# Patient Record
Sex: MALE | Race: WHITE | NOT HISPANIC OR LATINO | Employment: UNEMPLOYED | URBAN - METROPOLITAN AREA
[De-identification: names, ages, dates, MRNs, and addresses within clinical notes are randomized per-mention and may not be internally consistent; named-entity substitution may affect disease eponyms.]

---

## 2021-12-16 ENCOUNTER — HOSPITAL ENCOUNTER (INPATIENT)
Facility: OTHER | Age: 56
LOS: 3 days | Discharge: HOME | DRG: 287 | End: 2021-12-19
Attending: HOSPITALIST | Admitting: INTERNAL MEDICINE
Payer: MEDICAID

## 2021-12-16 DIAGNOSIS — I27.20 PULMONARY HYPERTENSION, UNSPECIFIED: Primary | ICD-10-CM

## 2021-12-16 DIAGNOSIS — I21.4 NSTEMI (NON-ST ELEVATED MYOCARDIAL INFARCTION): ICD-10-CM

## 2021-12-16 DIAGNOSIS — I50.812 CHRONIC RIGHT-SIDED CONGESTIVE HEART FAILURE: ICD-10-CM

## 2021-12-16 DIAGNOSIS — R07.9 CHEST PAIN: ICD-10-CM

## 2021-12-16 DIAGNOSIS — I5A MYOCARDIAL INJURY: ICD-10-CM

## 2021-12-16 PROBLEM — I10 PRIMARY HYPERTENSION: Status: ACTIVE | Noted: 2021-12-16

## 2021-12-16 LAB — TROPONIN I SERPL DL<=0.01 NG/ML-MCNC: 0.02 NG/ML (ref 0–0.03)

## 2021-12-16 PROCEDURE — 99900035 HC TECH TIME PER 15 MIN (STAT)

## 2021-12-16 PROCEDURE — 93005 ELECTROCARDIOGRAM TRACING: CPT

## 2021-12-16 PROCEDURE — 84484 ASSAY OF TROPONIN QUANT: CPT | Performed by: NURSE PRACTITIONER

## 2021-12-16 PROCEDURE — 99223 1ST HOSP IP/OBS HIGH 75: CPT | Mod: ,,, | Performed by: NURSE PRACTITIONER

## 2021-12-16 PROCEDURE — 21400001 HC TELEMETRY ROOM

## 2021-12-16 PROCEDURE — 94761 N-INVAS EAR/PLS OXIMETRY MLT: CPT

## 2021-12-16 PROCEDURE — 99222 1ST HOSP IP/OBS MODERATE 55: CPT | Mod: ,,, | Performed by: INTERNAL MEDICINE

## 2021-12-16 PROCEDURE — 99223 PR INITIAL HOSPITAL CARE,LEVL III: ICD-10-PCS | Mod: ,,, | Performed by: NURSE PRACTITIONER

## 2021-12-16 PROCEDURE — 25000003 PHARM REV CODE 250: Performed by: INTERNAL MEDICINE

## 2021-12-16 PROCEDURE — 25000003 PHARM REV CODE 250: Performed by: NURSE PRACTITIONER

## 2021-12-16 PROCEDURE — 99222 PR INITIAL HOSPITAL CARE,LEVL II: ICD-10-PCS | Mod: ,,, | Performed by: INTERNAL MEDICINE

## 2021-12-16 PROCEDURE — 27000221 HC OXYGEN, UP TO 24 HOURS

## 2021-12-16 PROCEDURE — 36415 COLL VENOUS BLD VENIPUNCTURE: CPT | Performed by: NURSE PRACTITIONER

## 2021-12-16 RX ORDER — LISINOPRIL AND HYDROCHLOROTHIAZIDE 20; 25 MG/1; MG/1
1 TABLET ORAL DAILY
Status: DISCONTINUED | OUTPATIENT
Start: 2021-12-17 | End: 2021-12-16

## 2021-12-16 RX ORDER — ENOXAPARIN SODIUM 100 MG/ML
40 INJECTION SUBCUTANEOUS EVERY 24 HOURS
Status: DISCONTINUED | OUTPATIENT
Start: 2021-12-16 | End: 2021-12-16

## 2021-12-16 RX ORDER — ALLOPURINOL 300 MG/1
300 TABLET ORAL DAILY
Status: DISCONTINUED | OUTPATIENT
Start: 2021-12-17 | End: 2021-12-19 | Stop reason: HOSPADM

## 2021-12-16 RX ORDER — SPIRONOLACTONE 25 MG/1
25 TABLET ORAL DAILY
Status: DISCONTINUED | OUTPATIENT
Start: 2021-12-17 | End: 2021-12-17

## 2021-12-16 RX ORDER — CARVEDILOL 12.5 MG/1
25 TABLET ORAL 2 TIMES DAILY
Status: DISCONTINUED | OUTPATIENT
Start: 2021-12-16 | End: 2021-12-17

## 2021-12-16 RX ORDER — HYDROCHLOROTHIAZIDE 25 MG/1
25 TABLET ORAL DAILY
Status: DISCONTINUED | OUTPATIENT
Start: 2021-12-17 | End: 2021-12-16

## 2021-12-16 RX ORDER — ACETAMINOPHEN 325 MG/1
650 TABLET ORAL EVERY 4 HOURS PRN
Status: DISCONTINUED | OUTPATIENT
Start: 2021-12-16 | End: 2021-12-19 | Stop reason: HOSPADM

## 2021-12-16 RX ORDER — VALSARTAN 80 MG/1
320 TABLET ORAL DAILY
Status: DISCONTINUED | OUTPATIENT
Start: 2021-12-17 | End: 2021-12-17

## 2021-12-16 RX ORDER — AMLODIPINE BESYLATE 2.5 MG/1
2.5 TABLET ORAL DAILY
Status: DISCONTINUED | OUTPATIENT
Start: 2021-12-17 | End: 2021-12-17

## 2021-12-16 RX ORDER — NITROGLYCERIN 80 MG/1
1 PATCH TRANSDERMAL DAILY
Status: DISCONTINUED | OUTPATIENT
Start: 2021-12-16 | End: 2021-12-17

## 2021-12-16 RX ORDER — ONDANSETRON 8 MG/1
8 TABLET, ORALLY DISINTEGRATING ORAL EVERY 8 HOURS PRN
Status: DISCONTINUED | OUTPATIENT
Start: 2021-12-16 | End: 2021-12-19 | Stop reason: HOSPADM

## 2021-12-16 RX ORDER — SODIUM CHLORIDE 9 MG/ML
INJECTION, SOLUTION INTRAVENOUS CONTINUOUS
Status: CANCELLED | OUTPATIENT
Start: 2021-12-17

## 2021-12-16 RX ORDER — FUROSEMIDE 20 MG/1
40 TABLET ORAL DAILY
Status: DISCONTINUED | OUTPATIENT
Start: 2021-12-17 | End: 2021-12-17

## 2021-12-16 RX ORDER — LISINOPRIL 20 MG/1
20 TABLET ORAL DAILY
Status: DISCONTINUED | OUTPATIENT
Start: 2021-12-17 | End: 2021-12-16

## 2021-12-16 RX ORDER — NITROGLYCERIN 0.4 MG/1
0.4 TABLET SUBLINGUAL EVERY 5 MIN PRN
Status: DISCONTINUED | OUTPATIENT
Start: 2021-12-16 | End: 2021-12-18

## 2021-12-16 RX ORDER — DIPHENHYDRAMINE HCL 25 MG
25 CAPSULE ORAL
Status: CANCELLED | OUTPATIENT
Start: 2021-12-17

## 2021-12-16 RX ORDER — SODIUM CHLORIDE 0.9 % (FLUSH) 0.9 %
10 SYRINGE (ML) INJECTION EVERY 8 HOURS PRN
Status: DISCONTINUED | OUTPATIENT
Start: 2021-12-16 | End: 2021-12-19 | Stop reason: HOSPADM

## 2021-12-16 RX ORDER — ASPIRIN 81 MG/1
81 TABLET ORAL DAILY
Status: DISCONTINUED | OUTPATIENT
Start: 2021-12-17 | End: 2021-12-17

## 2021-12-16 RX ADMIN — CARVEDILOL 25 MG: 12.5 TABLET, FILM COATED ORAL at 08:12

## 2021-12-16 RX ADMIN — NITROGLYCERIN 1 PATCH: 0.4 PATCH TRANSDERMAL at 08:12

## 2021-12-16 NOTE — PROVIDER TRANSFER
Outside Transfer Acceptance Note / [facility] Referral [facility]    Referring facility: [facility] [place]  Referring provider: LEFORT, GUY J.  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  Patient had a milder episode earlier this week. Labs WBC 5.8, Hb 16.7, Plts 235, Na 142, K 4.3, CO2 28, Cr 1.5, BNP 23, troponin < 0.05, repeat troponin pending.  COVID neg.  CXR pos for mild PVC.  EKG pos for ST depression in V3-V4 which was new when compared to prior.  Patient given Lasix, sublingual NG and aspirin.  CP has resolved.  Referring physician spoke to [facility] (Dr Anjelica Botello) who requested transfer for admission by  with no additional recommendations.  Transfer diagnosis NSTEMI      Review of patient's allergies indicates:   Allergen Reactions    Penicillins Other (See Comments)     Reaction unknown- childhood allergy      NPO: No      Anticoagulation:   Anticoagulants     None           Instructions      [facility]  Admit to Hospital Medicine  Upon patient arrival to floor, please contact Hospital Medicine on call.

## 2021-12-16 NOTE — Clinical Note
A percutaneous stick to the right radial artery was performed. Ultrasound guidance was used to obtain access.

## 2021-12-16 NOTE — ASSESSMENT & PLAN NOTE
Patient reports chest pain, troponin <.005, ST depression in V3,V4    Consult Cardiology  Cardiac monitoring  Trend Troponin  EKG

## 2021-12-16 NOTE — Clinical Note
The radial band was applied to the right radial artery. 9 cc's of air were inserted into the closure device.

## 2021-12-16 NOTE — Clinical Note
The catheter was repositioned into the ostium   left main. An angiography was performed of the left coronary arteries. Multiple views were taken. The angiography was performed via hand injection with .

## 2021-12-16 NOTE — HPI
The patient is a 56-year-old male with a past medical history of amphetamine use (in recovery) CHF and PE presented to [facility] today with CP starting 90 minutes earlier with onset during light activity and a/w SOB nausea and diaphoresis.  Patient had a milder episode earlier this week. Labs WBC 5.8, Hb 16.7, Plts 235, Na 142, K 4.3, CO2 28, Cr 1.5, BNP 23, troponin < 0.05, repeat troponin pending.  COVID neg.  CXR pos for mild PVC.  EKG pos for ST depression in V3-V4 which was new when compared to prior.  Patient given Lasix, sublingual NG and aspirin.  CP has resolved.  The patient was transferred to [facility] for Cardiology consult.

## 2021-12-16 NOTE — Clinical Note
65 ml of contrast were injected throughout the case. 35 mL of contrast was the total wasted during the case. 100 mL was the total amount used during the case.

## 2021-12-16 NOTE — Clinical Note
The site was marked. The groin was prepped. The site was prepped with ChloraPrep. The site was clipped. The patient was draped. The patient was positioned supine.

## 2021-12-16 NOTE — Clinical Note
The catheter was repositioned into the ostium   right coronary artery. An angiography was performed of the right coronary arteries. Multiple views were taken. The angiography was performed via hand injection with .

## 2021-12-17 PROBLEM — R07.9 CHEST PAIN: Status: ACTIVE | Noted: 2021-12-16

## 2021-12-17 PROBLEM — I27.82 CHRONIC PULMONARY EMBOLISM: Status: ACTIVE | Noted: 2021-12-17

## 2021-12-17 PROBLEM — I50.9 CHF (CONGESTIVE HEART FAILURE): Status: ACTIVE | Noted: 2021-12-17

## 2021-12-17 LAB
ALBUMIN SERPL BCP-MCNC: 3.5 G/DL (ref 3.5–5.2)
ALP SERPL-CCNC: 69 U/L (ref 55–135)
ALT SERPL W/O P-5'-P-CCNC: 151 U/L (ref 10–44)
AMPHET+METHAMPHET UR QL: NEGATIVE
ANION GAP SERPL CALC-SCNC: 14 MMOL/L (ref 8–16)
APTT BLDCRRT: 28.4 SEC (ref 21–32)
ASCENDING AORTA: 2.97 CM
AST SERPL-CCNC: 101 U/L (ref 10–40)
AV INDEX (PROSTH): 0.76
AV MEAN GRADIENT: 3 MMHG
AV PEAK GRADIENT: 6 MMHG
AV VALVE AREA: 2.19 CM2
AV VELOCITY RATIO: 0.7
BARBITURATES UR QL SCN>200 NG/ML: NEGATIVE
BASOPHILS # BLD AUTO: 0.05 K/UL (ref 0–0.2)
BASOPHILS # BLD AUTO: 0.06 K/UL (ref 0–0.2)
BASOPHILS NFR BLD: 0.8 % (ref 0–1.9)
BASOPHILS NFR BLD: 0.8 % (ref 0–1.9)
BENZODIAZ UR QL SCN>200 NG/ML: NEGATIVE
BILIRUB SERPL-MCNC: 1 MG/DL (ref 0.1–1)
BNP SERPL-MCNC: 13 PG/ML (ref 0–99)
BSA FOR ECHO PROCEDURE: 1.94 M2
BUN SERPL-MCNC: 25 MG/DL (ref 6–20)
BZE UR QL SCN: NEGATIVE
CALCIUM SERPL-MCNC: 9.3 MG/DL (ref 8.7–10.5)
CANNABINOIDS UR QL SCN: NEGATIVE
CHLORIDE SERPL-SCNC: 102 MMOL/L (ref 95–110)
CHOLEST SERPL-MCNC: 197 MG/DL (ref 120–199)
CHOLEST/HDLC SERPL: 6.6 {RATIO} (ref 2–5)
CO2 SERPL-SCNC: 23 MMOL/L (ref 23–29)
CREAT SERPL-MCNC: 1.2 MG/DL (ref 0.5–1.4)
CREAT UR-MCNC: 160.1 MG/DL (ref 23–375)
CV ECHO LV RWT: 0.37 CM
D DIMER PPP IA.FEU-MCNC: <0.19 MG/L FEU
DIFFERENTIAL METHOD: NORMAL
DIFFERENTIAL METHOD: NORMAL
DOP CALC AO PEAK VEL: 1.22 M/S
DOP CALC AO VTI: 25.93 CM
DOP CALC LVOT AREA: 2.9 CM2
DOP CALC LVOT DIAMETER: 1.92 CM
DOP CALC LVOT PEAK VEL: 0.85 M/S
DOP CALC LVOT STROKE VOLUME: 56.75 CM3
DOP CALCLVOT PEAK VEL VTI: 19.61 CM
ECHO LV POSTERIOR WALL: 0.76 CM (ref 0.6–1.1)
EJECTION FRACTION: 60 %
EOSINOPHIL # BLD AUTO: 0.2 K/UL (ref 0–0.5)
EOSINOPHIL # BLD AUTO: 0.2 K/UL (ref 0–0.5)
EOSINOPHIL NFR BLD: 2 % (ref 0–8)
EOSINOPHIL NFR BLD: 2.8 % (ref 0–8)
ERYTHROCYTE [DISTWIDTH] IN BLOOD BY AUTOMATED COUNT: 13.4 % (ref 11.5–14.5)
ERYTHROCYTE [DISTWIDTH] IN BLOOD BY AUTOMATED COUNT: 13.5 % (ref 11.5–14.5)
EST. GFR  (AFRICAN AMERICAN): >60 ML/MIN/1.73 M^2
EST. GFR  (NON AFRICAN AMERICAN): >60 ML/MIN/1.73 M^2
ETHANOL UR-MCNC: <10 MG/DL
FRACTIONAL SHORTENING: 32 % (ref 28–44)
GLUCOSE SERPL-MCNC: 113 MG/DL (ref 70–110)
HCT VFR BLD AUTO: 48.7 % (ref 40–54)
HCT VFR BLD AUTO: 52.7 % (ref 40–54)
HDLC SERPL-MCNC: 30 MG/DL (ref 40–75)
HDLC SERPL: 15.2 % (ref 20–50)
HGB BLD-MCNC: 16 G/DL (ref 14–18)
HGB BLD-MCNC: 17.5 G/DL (ref 14–18)
IMM GRANULOCYTES # BLD AUTO: 0.03 K/UL (ref 0–0.04)
IMM GRANULOCYTES # BLD AUTO: 0.03 K/UL (ref 0–0.04)
IMM GRANULOCYTES NFR BLD AUTO: 0.4 % (ref 0–0.5)
IMM GRANULOCYTES NFR BLD AUTO: 0.5 % (ref 0–0.5)
INR PPP: 1 (ref 0.8–1.2)
INTERVENTRICULAR SEPTUM: 0.84 CM (ref 0.6–1.1)
LA MAJOR: 5.68 CM
LA MINOR: 5.33 CM
LA WIDTH: 4.28 CM
LDLC SERPL CALC-MCNC: 116.8 MG/DL (ref 63–159)
LEFT ATRIUM SIZE: 4.1 CM
LEFT ATRIUM VOLUME INDEX MOD: 34.6 ML/M2
LEFT ATRIUM VOLUME INDEX: 42.9 ML/M2
LEFT ATRIUM VOLUME MOD: 66 CM3
LEFT ATRIUM VOLUME: 82.03 CM3
LEFT INTERNAL DIMENSION IN SYSTOLE: 2.79 CM (ref 2.1–4)
LEFT VENTRICLE DIASTOLIC VOLUME INDEX: 38.58 ML/M2
LEFT VENTRICLE DIASTOLIC VOLUME: 73.68 ML
LEFT VENTRICLE MASS INDEX: 51 G/M2
LEFT VENTRICLE SYSTOLIC VOLUME INDEX: 15.3 ML/M2
LEFT VENTRICLE SYSTOLIC VOLUME: 29.28 ML
LEFT VENTRICULAR INTERNAL DIMENSION IN DIASTOLE: 4.09 CM (ref 3.5–6)
LEFT VENTRICULAR MASS: 96.95 G
LYMPHOCYTES # BLD AUTO: 2.1 K/UL (ref 1–4.8)
LYMPHOCYTES # BLD AUTO: 2.6 K/UL (ref 1–4.8)
LYMPHOCYTES NFR BLD: 32.8 % (ref 18–48)
LYMPHOCYTES NFR BLD: 35.3 % (ref 18–48)
MAGNESIUM SERPL-MCNC: 1.9 MG/DL (ref 1.6–2.6)
MCH RBC QN AUTO: 30.4 PG (ref 27–31)
MCH RBC QN AUTO: 30.8 PG (ref 27–31)
MCHC RBC AUTO-ENTMCNC: 32.9 G/DL (ref 32–36)
MCHC RBC AUTO-ENTMCNC: 33.2 G/DL (ref 32–36)
MCV RBC AUTO: 93 FL (ref 82–98)
MCV RBC AUTO: 93 FL (ref 82–98)
METHADONE UR QL SCN>300 NG/ML: NEGATIVE
MONOCYTES # BLD AUTO: 0.6 K/UL (ref 0.3–1)
MONOCYTES # BLD AUTO: 0.6 K/UL (ref 0.3–1)
MONOCYTES NFR BLD: 8.2 % (ref 4–15)
MONOCYTES NFR BLD: 9.9 % (ref 4–15)
NEUTROPHILS # BLD AUTO: 3.4 K/UL (ref 1.8–7.7)
NEUTROPHILS # BLD AUTO: 4 K/UL (ref 1.8–7.7)
NEUTROPHILS NFR BLD: 53.2 % (ref 38–73)
NEUTROPHILS NFR BLD: 53.3 % (ref 38–73)
NONHDLC SERPL-MCNC: 167 MG/DL
NRBC BLD-RTO: 0 /100 WBC
NRBC BLD-RTO: 0 /100 WBC
OPIATES UR QL SCN: NEGATIVE
PCP UR QL SCN>25 NG/ML: NEGATIVE
PHOSPHATE SERPL-MCNC: 3.8 MG/DL (ref 2.7–4.5)
PISA MRMAX VEL: 0.04 M/S
PISA TR MAX VEL: 3.81 M/S
PLATELET # BLD AUTO: 202 K/UL (ref 150–450)
PLATELET # BLD AUTO: 230 K/UL (ref 150–450)
PMV BLD AUTO: 9.7 FL (ref 9.2–12.9)
PMV BLD AUTO: 9.8 FL (ref 9.2–12.9)
POTASSIUM SERPL-SCNC: 4.4 MMOL/L (ref 3.5–5.1)
PROT SERPL-MCNC: 7 G/DL (ref 6–8.4)
PROTHROMBIN TIME: 10.9 SEC (ref 9–12.5)
PULM VEIN S/D RATIO: 1.68
PV PEAK D VEL: 0.28 M/S
PV PEAK S VEL: 0.47 M/S
PV PEAK VELOCITY: 0.85 CM/S
RA MAJOR: 6.28 CM
RA PRESSURE: 8 MMHG
RA WIDTH: 3.67 CM
RBC # BLD AUTO: 5.26 M/UL (ref 4.6–6.2)
RBC # BLD AUTO: 5.68 M/UL (ref 4.6–6.2)
SINUS: 3.21 CM
SODIUM SERPL-SCNC: 139 MMOL/L (ref 136–145)
STJ: 3.02 CM
TDI LATERAL: 0.07 M/S
TDI SEPTAL: 0.03 M/S
TDI: 0.05 M/S
TOXICOLOGY INFORMATION: NORMAL
TR MAX PG: 58 MMHG
TRICUSPID ANNULAR PLANE SYSTOLIC EXCURSION: 1.77 CM
TRIGL SERPL-MCNC: 251 MG/DL (ref 30–150)
TROPONIN I SERPL DL<=0.01 NG/ML-MCNC: 0.02 NG/ML (ref 0–0.03)
TV REST PULMONARY ARTERY PRESSURE: 66 MMHG
WBC # BLD AUTO: 6.44 K/UL (ref 3.9–12.7)
WBC # BLD AUTO: 7.44 K/UL (ref 3.9–12.7)

## 2021-12-17 PROCEDURE — 25000003 PHARM REV CODE 250: Performed by: NURSE PRACTITIONER

## 2021-12-17 PROCEDURE — 36415 COLL VENOUS BLD VENIPUNCTURE: CPT | Performed by: NURSE PRACTITIONER

## 2021-12-17 PROCEDURE — 63600175 PHARM REV CODE 636 W HCPCS: Performed by: INTERNAL MEDICINE

## 2021-12-17 PROCEDURE — 80307 DRUG TEST PRSMV CHEM ANLYZR: CPT | Performed by: NURSE PRACTITIONER

## 2021-12-17 PROCEDURE — 99233 PR SUBSEQUENT HOSPITAL CARE,LEVL III: ICD-10-PCS | Mod: 25,,, | Performed by: INTERNAL MEDICINE

## 2021-12-17 PROCEDURE — 93460 R&L HRT ART/VENTRICLE ANGIO: CPT | Mod: 26,,, | Performed by: INTERNAL MEDICINE

## 2021-12-17 PROCEDURE — 21400001 HC TELEMETRY ROOM

## 2021-12-17 PROCEDURE — 93005 ELECTROCARDIOGRAM TRACING: CPT

## 2021-12-17 PROCEDURE — 85379 FIBRIN DEGRADATION QUANT: CPT | Performed by: INTERNAL MEDICINE

## 2021-12-17 PROCEDURE — 99233 PR SUBSEQUENT HOSPITAL CARE,LEVL III: ICD-10-PCS | Mod: ,,, | Performed by: INTERNAL MEDICINE

## 2021-12-17 PROCEDURE — 84100 ASSAY OF PHOSPHORUS: CPT | Performed by: NURSE PRACTITIONER

## 2021-12-17 PROCEDURE — 85730 THROMBOPLASTIN TIME PARTIAL: CPT | Performed by: INTERNAL MEDICINE

## 2021-12-17 PROCEDURE — 85025 COMPLETE CBC W/AUTO DIFF WBC: CPT | Performed by: NURSE PRACTITIONER

## 2021-12-17 PROCEDURE — 85610 PROTHROMBIN TIME: CPT | Performed by: INTERNAL MEDICINE

## 2021-12-17 PROCEDURE — C1751 CATH, INF, PER/CENT/MIDLINE: HCPCS | Performed by: INTERNAL MEDICINE

## 2021-12-17 PROCEDURE — 25000003 PHARM REV CODE 250: Performed by: INTERNAL MEDICINE

## 2021-12-17 PROCEDURE — C1769 GUIDE WIRE: HCPCS | Performed by: INTERNAL MEDICINE

## 2021-12-17 PROCEDURE — C1894 INTRO/SHEATH, NON-LASER: HCPCS | Performed by: INTERNAL MEDICINE

## 2021-12-17 PROCEDURE — 83880 ASSAY OF NATRIURETIC PEPTIDE: CPT | Performed by: INTERNAL MEDICINE

## 2021-12-17 PROCEDURE — 83735 ASSAY OF MAGNESIUM: CPT | Performed by: NURSE PRACTITIONER

## 2021-12-17 PROCEDURE — 25500020 PHARM REV CODE 255: Performed by: INTERNAL MEDICINE

## 2021-12-17 PROCEDURE — 93460 PR CATH PLACE/CORON ANGIO, IMG SUPER/INTERP,R&L HRT CATH, L HRT VENTRIC: ICD-10-PCS | Mod: 26,,, | Performed by: INTERNAL MEDICINE

## 2021-12-17 PROCEDURE — 99233 SBSQ HOSP IP/OBS HIGH 50: CPT | Mod: ,,, | Performed by: INTERNAL MEDICINE

## 2021-12-17 PROCEDURE — 84484 ASSAY OF TROPONIN QUANT: CPT | Performed by: NURSE PRACTITIONER

## 2021-12-17 PROCEDURE — C1887 CATHETER, GUIDING: HCPCS | Performed by: INTERNAL MEDICINE

## 2021-12-17 PROCEDURE — 27000221 HC OXYGEN, UP TO 24 HOURS

## 2021-12-17 PROCEDURE — 36415 COLL VENOUS BLD VENIPUNCTURE: CPT | Performed by: INTERNAL MEDICINE

## 2021-12-17 PROCEDURE — 93460 R&L HRT ART/VENTRICLE ANGIO: CPT | Performed by: INTERNAL MEDICINE

## 2021-12-17 PROCEDURE — 85025 COMPLETE CBC W/AUTO DIFF WBC: CPT | Mod: 91 | Performed by: INTERNAL MEDICINE

## 2021-12-17 PROCEDURE — 94761 N-INVAS EAR/PLS OXIMETRY MLT: CPT

## 2021-12-17 PROCEDURE — 80053 COMPREHEN METABOLIC PANEL: CPT | Performed by: NURSE PRACTITIONER

## 2021-12-17 PROCEDURE — 80061 LIPID PANEL: CPT | Performed by: NURSE PRACTITIONER

## 2021-12-17 PROCEDURE — 99233 SBSQ HOSP IP/OBS HIGH 50: CPT | Mod: 25,,, | Performed by: INTERNAL MEDICINE

## 2021-12-17 RX ORDER — HEPARIN SODIUM,PORCINE/D5W 25000/250
0-40 INTRAVENOUS SOLUTION INTRAVENOUS CONTINUOUS
Status: DISCONTINUED | OUTPATIENT
Start: 2021-12-17 | End: 2021-12-19

## 2021-12-17 RX ORDER — MIDAZOLAM HYDROCHLORIDE 1 MG/ML
INJECTION INTRAMUSCULAR; INTRAVENOUS
Status: DISCONTINUED | OUTPATIENT
Start: 2021-12-17 | End: 2021-12-17 | Stop reason: HOSPADM

## 2021-12-17 RX ORDER — SODIUM CHLORIDE 9 MG/ML
INJECTION, SOLUTION INTRAVENOUS CONTINUOUS
Status: ACTIVE | OUTPATIENT
Start: 2021-12-17 | End: 2021-12-18

## 2021-12-17 RX ORDER — ATORVASTATIN CALCIUM 20 MG/1
40 TABLET, FILM COATED ORAL DAILY
Status: DISCONTINUED | OUTPATIENT
Start: 2021-12-17 | End: 2021-12-19 | Stop reason: HOSPADM

## 2021-12-17 RX ORDER — ONDANSETRON 8 MG/1
8 TABLET, ORALLY DISINTEGRATING ORAL EVERY 8 HOURS PRN
Status: DISCONTINUED | OUTPATIENT
Start: 2021-12-17 | End: 2021-12-19 | Stop reason: HOSPADM

## 2021-12-17 RX ORDER — FENTANYL CITRATE 50 UG/ML
INJECTION, SOLUTION INTRAMUSCULAR; INTRAVENOUS
Status: DISCONTINUED | OUTPATIENT
Start: 2021-12-17 | End: 2021-12-17 | Stop reason: HOSPADM

## 2021-12-17 RX ORDER — ACETAMINOPHEN 325 MG/1
650 TABLET ORAL EVERY 4 HOURS PRN
Status: DISCONTINUED | OUTPATIENT
Start: 2021-12-17 | End: 2021-12-19 | Stop reason: HOSPADM

## 2021-12-17 RX ADMIN — SPIRONOLACTONE 25 MG: 25 TABLET ORAL at 10:12

## 2021-12-17 RX ADMIN — NITROGLYCERIN 1 PATCH: 0.4 PATCH TRANSDERMAL at 10:12

## 2021-12-17 RX ADMIN — ASPIRIN 81 MG: 81 TABLET, COATED ORAL at 10:12

## 2021-12-17 RX ADMIN — ALLOPURINOL 300 MG: 300 TABLET ORAL at 10:12

## 2021-12-17 RX ADMIN — VALSARTAN 320 MG: 80 TABLET, FILM COATED ORAL at 10:12

## 2021-12-17 RX ADMIN — SODIUM CHLORIDE: 0.9 INJECTION, SOLUTION INTRAVENOUS at 09:12

## 2021-12-17 RX ADMIN — CARVEDILOL 25 MG: 12.5 TABLET, FILM COATED ORAL at 10:12

## 2021-12-17 RX ADMIN — ACETAMINOPHEN 650 MG: 325 TABLET, FILM COATED ORAL at 05:12

## 2021-12-17 RX ADMIN — HEPARIN SODIUM 18 UNITS/KG/HR: 10000 INJECTION, SOLUTION INTRAVENOUS at 10:12

## 2021-12-17 RX ADMIN — ATORVASTATIN CALCIUM 40 MG: 20 TABLET, FILM COATED ORAL at 09:12

## 2021-12-17 NOTE — PROGRESS NOTES
[facility]  Progress Note    Patient Name: Claude Griffin  MRN: [id number]  Patient Class: IP- Inpatient   Admission Date: 12/16/2021  Length of Stay: 1 days  Attending Physician: Salima Chu MD  Primary Care Provider: David Terrell MD        Subjective:     Principal Problem:Chest pain        HPI:  The patient is a 56-year-old male with a past medical history of amphetamine use (in recovery) CHF and PE presented to [facility] today with CP starting 90 minutes earlier with onset during light activity and a/w SOB nausea and diaphoresis.  Patient had a milder episode earlier this week. Labs WBC 5.8, Hb 16.7, Plts 235, Na 142, K 4.3, CO2 28, Cr 1.5, BNP 23, troponin < 0.05, repeat troponin pending.  COVID neg.  CXR pos for mild PVC.  EKG pos for ST depression in V3-V4 which was new when compared to prior.  Patient given Lasix, sublingual NG and aspirin.  CP has resolved.  The patient was transferred to [facility] for Cardiology consult.      Overview/Hospital Course:  No notes on file    Interval History: Cheat pain better, c/o sob on minimal activity.    Review of Systems   Constitutional: Negative for chills and fever.   HENT: Negative for trouble swallowing.    Respiratory: Positive for shortness of breath. Negative for cough.    Cardiovascular: Positive for chest pain. Negative for leg swelling.   Gastrointestinal: Negative for abdominal pain, blood in stool, nausea and vomiting.   Genitourinary: Negative for dysuria and hematuria.   Musculoskeletal: Negative for gait problem.   Skin: Negative for rash.   Neurological: Negative for headaches.   Psychiatric/Behavioral: Negative for confusion.     Objective:     Vital Signs (Most Recent):  Temp: 97.9 °F (36.6 °C) (12/17/21 1226)  Pulse: 62 (12/17/21 1226)  Resp: 18 (12/17/21 1226)  BP: 113/76 (12/17/21 1226)  SpO2: (!) 94 % (12/17/21 1226) Vital Signs (24h Range):  Temp:  [97.6 °F (36.4 °C)-98.8 °F (37.1  °C)] 97.9 °F (36.6 °C)  Pulse:  [56-77] 62  Resp:  [17-20] 18  SpO2:  [93 %-99 %] 94 %  BP: ()/(60-80) 113/76     Weight: 81.2 kg (179 lb)  Body mass index is 28.89 kg/m².    Intake/Output Summary (Last 24 hours) at 12/17/2021 1351  Last data filed at 12/17/2021 1100  Gross per 24 hour   Intake 770 ml   Output 1175 ml   Net -405 ml      Physical Exam  Vitals reviewed.   Constitutional:       General: He is not in acute distress.     Appearance: He is well-developed.   HENT:      Head: Normocephalic and atraumatic.   Eyes:      Extraocular Movements: Extraocular movements intact.      Pupils: Pupils are equal, round, and reactive to light.   Cardiovascular:      Rate and Rhythm: Normal rate and regular rhythm.   Pulmonary:      Effort: Pulmonary effort is normal. No respiratory distress.      Breath sounds: Normal breath sounds.   Abdominal:      General: Bowel sounds are normal. There is no distension.      Palpations: Abdomen is soft.      Tenderness: There is no abdominal tenderness.   Musculoskeletal:         General: No swelling. Normal range of motion.      Cervical back: Normal range of motion and neck supple.   Skin:     General: Skin is warm.      Findings: No rash.   Neurological:      General: No focal deficit present.      Mental Status: He is alert and oriented to person, place, and time.   Psychiatric:         Mood and Affect: Mood normal.         Behavior: Behavior normal.         Significant Labs: All pertinent labs within the past 24 hours have been reviewed.    Significant Imaging: I have reviewed all pertinent imaging results/findings within the past 24 hours.      Assessment/Plan:      * Chest pain  Patient reports chest pain, troponin <.005, ST depression in V3,V4    Consulted Cardiology, plan for [facility] 12/17  Cardiac monitoring  Trend Troponin, negative  Continue asa, coreg  Results for orders placed during the hospital encounter of 12/16/21    Echo Saline Bubble? No    Interpretation  Summary  · The left ventricle is normal in size with normal systolic function.  · The estimated ejection fraction is 60%.  · Grade II left ventricular diastolic dysfunction.  · There is abnormal septal wall motion. There is systolic flattening of the interventricular septum consistent with right ventricle pressure overload.  · Moderate left atrial enlargement.  · Severe right ventricular enlargement with severely reduced right ventricular systolic function.  · Severe right atrial enlargement.  · Mild mitral regurgitation.  · Mild tricuspid regurgitation.  · There is moderate pulmonary hypertension.  · The estimated PA systolic pressure is 66 mmHg.  · Intermediate central venous pressure (8 mmHg).          CHF (congestive heart failure)  Diagnosed a year ago.      Chronic pulmonary embolism  Diagnosed a year ago  On eliquis  Will get ct chest after [facility] for pulmonary hypertension      Primary hypertension  Normotensive currently    Continue valsartan, aldactone, coreg, amlodipine  Home meds show losartan and valsartan          VTE Risk Mitigation (From admission, onward)         Ordered     IP VTE LOW RISK PATIENT  Once         12/16/21 1741     Place sequential compression device  Until discontinued         12/16/21 1741                Discharge Planning   DOMENIC:      Code Status: Full Code   Is the patient medically ready for discharge?:     Reason for patient still in hospital (select all that apply): Patient trending condition and Treatment                     Salima Chu MD  Department of Hospital Medicine   [facility] - Med Surg ([facility])

## 2021-12-17 NOTE — ASSESSMENT & PLAN NOTE
Patient reports chest pain, troponin <.005, ST depression in V3,V4    Consulted Cardiology, plan for [facility] 12/17  Cardiac monitoring  Trend Troponin, negative  Continue asa, coreg  Results for orders placed during the hospital encounter of 12/16/21    Echo Saline Bubble? No    Interpretation Summary  · The left ventricle is normal in size with normal systolic function.  · The estimated ejection fraction is 60%.  · Grade II left ventricular diastolic dysfunction.  · There is abnormal septal wall motion. There is systolic flattening of the interventricular septum consistent with right ventricle pressure overload.  · Moderate left atrial enlargement.  · Severe right ventricular enlargement with severely reduced right ventricular systolic function.  · Severe right atrial enlargement.  · Mild mitral regurgitation.  · Mild tricuspid regurgitation.  · There is moderate pulmonary hypertension.  · The estimated PA systolic pressure is 66 mmHg.  · Intermediate central venous pressure (8 mmHg).

## 2021-12-17 NOTE — CONSULTS
[facility] ([facility])  Cardiology  Consult Note    Patient Name: Claude Griffin  MRN: [id number]  Admission Date: 12/16/2021  Hospital Length of Stay: 0 days  Code Status: Full Code   Attending Provider: Lora Modi MD   Consulting Provider: Anjelica Botello MD  Primary Care Physician: David Terrell MD  Principal Problem:Myocardial injury    Patient information was obtained from patient.     Inpatient consult to Cardiology  Consult performed by: Anjelica Botello MD  Consult ordered by: Uzair Corbin NP  Reason for consult: Chest pain        Subjective:     Chief Complaint:  Chest pain.     HPI:     Claude Griffin is a 56 y.o.male with hypertension. He lives in [place], [place]. He had pulmonary emboli in early 2020. He was treated at [facility]. He has been anticoagulated with apixiban since. In about 2/2021 he was diagnosed with heart failure and he was admitted to [facility] for a few days. 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 He took it easy for a few days. On 12/16/2021 he was doing lighter clean up work when he began sweating, his chest began aching and he felt short of breath. He presented to [facility] in [place]. He was given NTG and the pain mostly subsided. It was arranged for him to be transferred to [facility] for his further care.         Past Medical History:   Diagnosis Date    Gout age 40    Hypertension        Past Surgical History:   Procedure Laterality Date    NECK SURGERY  2009    Neck fusion       Review of patient's allergies indicates:   Allergen Reactions    Penicillins Other (See Comments)     Reaction unknown- childhood allergy       No current facility-administered medications on file prior to encounter.     Current Outpatient Medications on File Prior to Encounter   Medication Sig    allopurinol (ZYLOPRIM) 300 MG tablet Take 300 mg by mouth once  daily.     lisinopril-hydrochlorothiazide (PRINZIDE,ZESTORETIC) 20-25 mg Tab Take 1 tablet by mouth once daily.      Family History     Problem Relation (Age of Onset)    Diabetes Mother    Hypertension Father        Tobacco Use    Smoking status: Never Smoker    Smokeless tobacco: Never Used   Substance and Sexual Activity    Alcohol use: Yes     Comment: only socially- very rare    Drug use: No    Sexual activity: Not on file     Review of Systems   Constitutional: Negative for chills, fever and malaise/fatigue.   HENT: Negative for nosebleeds and tinnitus.    Eyes: Negative for double vision, vision loss in left eye and vision loss in right eye.   Cardiovascular: Positive for chest pain. Negative for claudication, dyspnea on exertion, irregular heartbeat, leg swelling, near-syncope, orthopnea, palpitations, paroxysmal nocturnal dyspnea and syncope.   Respiratory: Negative for cough, hemoptysis, shortness of breath and wheezing.    Endocrine: Negative for cold intolerance and heat intolerance.   Hematologic/Lymphatic: Negative for bleeding problem. Does not bruise/bleed easily.   Skin: Negative for color change and rash.   Musculoskeletal: Negative for back pain, falls, muscle weakness and myalgias.   Gastrointestinal: Negative for abdominal pain, diarrhea, dysphagia, heartburn, hematemesis, hematochezia, hemorrhoids, jaundice, melena, nausea and vomiting.   Genitourinary: Negative for dysuria and hematuria.   Neurological: Negative for dizziness, focal weakness, headaches, light-headedness, loss of balance, numbness, tremors, vertigo and weakness.   Psychiatric/Behavioral: Negative for altered mental status, depression and memory loss. The patient is not nervous/anxious.    Allergic/Immunologic: Negative for hives and persistent infections.     Objective:     Vital Signs (Most Recent):  Pulse: 63 (12/16/21 1823)  Resp: 20 (12/16/21 1818)  SpO2: 95 % (12/16/21 1818) Vital Signs (24h Range):  Temp:  [97.2 °F  (36.2 °C)-97.8 °F (36.6 °C)] 97.2 °F (36.2 °C)  Pulse:  [58-75] 63  Resp:  [16-20] 20  SpO2:  [95 %-97 %] 95 %  BP: (103-119)/(73-81) 119/81        There is no height or weight on file to calculate BMI.    SpO2: 95 %  O2 Device (Oxygen Therapy): nasal cannula    No intake or output data in the 24 hours ending 12/16/21 1837    Lines/Drains/Airways     None                 Physical Exam  Constitutional:       General: He is not in acute distress.     Appearance: Normal appearance. He is well-developed. He is not toxic-appearing or diaphoretic.   HENT:      Head: Normocephalic and atraumatic.      Nose: Nose normal.   Eyes:      General:         Right eye: No discharge.         Left eye: No discharge.      Conjunctiva/sclera:      Right eye: Right conjunctiva is not injected.      Left eye: Left conjunctiva is not injected.      Pupils: Pupils are equal.      Right eye: Pupil is round.      Left eye: Pupil is round.   Neck:      Thyroid: No thyromegaly.      Vascular: No carotid bruit or JVD.   Cardiovascular:      Rate and Rhythm: Normal rate and regular rhythm.  No extrasystoles are present.     Chest Wall: PMI is not displaced.      Pulses:           Radial pulses are 2+ on the right side and 2+ on the left side.        Femoral pulses are 2+ on the right side and 2+ on the left side.       Dorsalis pedis pulses are 2+ on the right side and 2+ on the left side.        Posterior tibial pulses are 2+ on the right side and 2+ on the left side.      Heart sounds: S1 normal and S2 normal. No gallop.       Comments: Marco Antonio's test negative right hand.   Pulmonary:      Effort: Pulmonary effort is normal.      Breath sounds: Normal breath sounds.   Abdominal:      Palpations: Abdomen is soft.      Tenderness: There is no abdominal tenderness.   Musculoskeletal:      Cervical back: Neck supple.      Right lower leg: Normal. No swelling. No edema.      Left lower leg: Normal. No swelling. No edema.   Lymphadenopathy:      Head:       Right side of head: No submandibular adenopathy.      Left side of head: No submandibular adenopathy.      Cervical: No cervical adenopathy.   Skin:     General: Skin is warm and dry.      Findings: No rash.      Nails: There is no clubbing.   Neurological:      General: No focal deficit present.      Mental Status: He is alert and oriented to person, place, and time. He is not disoriented.      Cranial Nerves: No cranial nerve deficit.   Psychiatric:         Attention and Perception: Attention and perception normal.         Mood and Affect: Mood and affect normal.         Speech: Speech normal.         Behavior: Behavior normal.         Thought Content: Thought content normal.         Cognition and Memory: Cognition and memory normal.         Judgment: Judgment normal.         Current Medications:     [START ON 12/17/2021] allopurinoL  300 mg Oral Daily    enoxaparin  40 mg Subcutaneous Daily    [START ON 12/17/2021] lisinopriL  20 mg Oral Daily    And    [START ON 12/17/2021] hydroCHLOROthiazide  25 mg Oral Daily     Current Laboratory Results:    No results found for this or any previous visit (from the past 24 hour(s)).  Current Imaging Results:    No orders to display       12/16/2021: ECG: SB: T wave inversion V1-V6.      Assessment and Plan:     Active Diagnoses:    Diagnosis Date Noted POA    PRINCIPAL PROBLEM:  Myocardial injury [I5A] 12/16/2021 Yes      Problems Resolved During this Admission:       Assessment and Plan:    1. Chest Pain   12/16/2021: Chest pain for about 40 min that resolved after NTG sl.   ECG reveals T wave inversion V1-V6.   Treat as ACS.   12/17/2021: Plan cardiac cath.   On aspirin 81 mg Q24.   He took apixiban 5 mg in am 12/16/2021: No need for further anticoagulation.   Continue carvedilol and add NTG patch.   Do Echo.    2. Chronic Anticoagulation   2020: Presented with pulmonary embolism   On apixiban 5 mg Q12.   Hold for angiogram.    3. History of Pulmonary  Embolism   2020: Presented with pulmonary embolism.   Appears unprovoked.    4. Hypertension   2000: Diagnosed.   At home been on carvedilol 25 mg Q12, amlodipine 2.5 mg Q24, valsartan 320 mg Q24, spironolactone 25 mg q24 and furosemide 40 mg Q24.        The planned procedure was discussed in detail with the patient and the family members present. Risk, benefit and alternatives were reviewed. All questions answered. Consent was then signed.    If further questions or concerns arise the patient was encouraged to contact me prior to the planned procedure.       VTE Risk Mitigation (From admission, onward)         Ordered     enoxaparin injection 40 mg  Daily         12/16/21 1741     IP VTE LOW RISK PATIENT  Once         12/16/21 1741     Place sequential compression device  Until discontinued         12/16/21 1741                Thank you for your consult.    I will follow-up with patient. Please contact us if you have any additional questions.    Anjelica Botello MD  Cardiology   [facility])

## 2021-12-17 NOTE — PLAN OF CARE
In cath lab  Unable to assess today  Await for final rec's from cardiology and Dr Chu  Case mgt to assess tomorrow       12/17/21 0246   Discharge Assessment   Assessment Type Discharge Planning Brief Assessment   Source of Information health record   Current cognitive status: Unable to Assess

## 2021-12-17 NOTE — PLAN OF CARE
Problem: Adult Inpatient Plan of Care  Goal: Plan of Care Review  Outcome: Ongoing, Progressing  Goal: Patient-Specific Goal (Individualized)  Outcome: Ongoing, Progressing  Goal: Optimal Comfort and Wellbeing  Outcome: Ongoing, Progressing

## 2021-12-17 NOTE — PROGRESS NOTES
[facility] Surg [facility]  Cardiology  Progress Note    Patient Name: Claude Griffin  MRN: [id number]  Admission Date: 12/16/2021  Hospital Length of Stay: 1 days  Code Status: Full Code   Attending Physician: Salima Chu MD   Primary Care Physician: David Terrell MD  Expected Discharge Date:   Principal Problem:Myocardial injury    Subjective:     Brief HPI:    Claude Griffin is a 56 y.o.male with hypertension. He lives in [place], [place]. He had pulmonary emboli in early 2020. He was treated at [facility]. He has been anticoagulated with apixiban since. In about 2/2021 he was diagnosed with heart failure and he was admitted to [facility] for a few days. 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 He took it easy for a few days. On 12/16/2021 he was doing lighter clean up work when he began sweating, his chest began aching and he felt short of breath. He presented to [facility] in [place]. He was given NTG and the pain mostly subsided. It was arranged for him to be transferred to [facility] for his further care.        Hospital Course:     Cardiac evaluation.    Interval History:    Comfortable.    No chest pain or SOB.      Review of Systems   Constitutional: Negative for chills, fever and malaise/fatigue.   HENT: Negative for nosebleeds.    Eyes: Negative for vision loss in left eye and vision loss in right eye.   Cardiovascular: Negative for chest pain, leg swelling, orthopnea, palpitations and paroxysmal nocturnal dyspnea.   Respiratory: Negative for cough, hemoptysis, shortness of breath, sputum production and wheezing.    Hematologic/Lymphatic: Negative for bleeding problem. Does not bruise/bleed easily.   Skin: Negative for color change and rash.   Musculoskeletal: Negative for muscle weakness and myalgias.   Gastrointestinal: Negative for abdominal pain, heartburn, hematemesis,  hematochezia, melena, nausea and vomiting.   Genitourinary: Negative for hematuria.   Neurological: Negative for dizziness, focal weakness, headaches, light-headedness, vertigo and weakness.   Psychiatric/Behavioral: Negative for altered mental status. The patient is not nervous/anxious.    Allergic/Immunologic: Negative for persistent infections.     Objective:     Vital Signs (Most Recent):  Temp: 97.6 °F (36.4 °C) (12/17/21 0739)  Pulse: 60 (12/17/21 0800)  Resp: 17 (12/17/21 0739)  BP: 99/73 (12/17/21 0739)  SpO2: 98 % (12/17/21 0757) Vital Signs (24h Range):  Temp:  [97.2 °F (36.2 °C)-98.8 °F (37.1 °C)] 97.6 °F (36.4 °C)  Pulse:  [58-77] 60  Resp:  [16-20] 17  SpO2:  [93 %-99 %] 98 %  BP: ()/(60-81) 99/73     Weight: 81.6 kg (179 lb 15.7 oz)  Body mass index is 29.05 kg/m².    SpO2: 98 %  O2 Device (Oxygen Therapy): nasal cannula      Intake/Output Summary (Last 24 hours) at 12/17/2021 0843  Last data filed at 12/17/2021 0245  Gross per 24 hour   Intake 720 ml   Output 875 ml   Net -155 ml       Lines/Drains/Airways     None                 Physical Exam  Constitutional:       General: He is not in acute distress.     Appearance: Normal appearance. He is well-developed. He is not ill-appearing.   Eyes:      Conjunctiva/sclera:      Right eye: Right conjunctiva is not injected. No hemorrhage.     Left eye: Left conjunctiva is not injected. No hemorrhage.     Pupils:      Right eye: Pupil is round.      Left eye: Pupil is round.   Neck:      Vascular: No JVD.   Cardiovascular:      Rate and Rhythm: Normal rate and regular rhythm.      Heart sounds: S1 normal and S2 normal. No murmur heard.  Gallop present. S4 sounds present. No S3 sounds.    Pulmonary:      Effort: Pulmonary effort is normal.      Breath sounds: Normal breath sounds.   Chest:      Chest wall: No swelling or tenderness.   Abdominal:      General: There is no distension.      Palpations: Abdomen is soft.      Tenderness: There is no abdominal  tenderness.   Musculoskeletal:      Cervical back: Neck supple.      Right ankle: No swelling.      Left ankle: No swelling.   Skin:     General: Skin is warm and dry.      Findings: No rash.   Neurological:      Mental Status: He is alert and oriented to person, place, and time. He is not disoriented.   Psychiatric:         Attention and Perception: Attention normal.         Mood and Affect: Mood normal.         Speech: Speech normal.         Behavior: Behavior normal.         Thought Content: Thought content normal.         Cognition and Memory: Cognition and memory normal.         Judgment: Judgment normal.         Current Medications:     allopurinoL  300 mg Oral Daily    amLODIPine  2.5 mg Oral Daily    aspirin  81 mg Oral Daily    carvediloL  25 mg Oral BID    furosemide  40 mg Oral Daily    nitroGLYCERIN 0.4 MG/HR TD PT24  1 patch Transdermal Daily    spironolactone  25 mg Oral Daily    valsartan  320 mg Oral Daily     Current Laboratory Results:    Recent Results (from the past 24 hour(s))   Troponin I    Collection Time: 12/16/21  7:46 PM   Result Value Ref Range    Troponin I 0.022 0.000 - 0.026 ng/mL   Comprehensive Metabolic Panel (CMP)    Collection Time: 12/17/21  3:57 AM   Result Value Ref Range    Sodium 139 136 - 145 mmol/L    Potassium 4.4 3.5 - 5.1 mmol/L    Chloride 102 95 - 110 mmol/L    CO2 23 23 - 29 mmol/L    Glucose 113 (H) 70 - 110 mg/dL    BUN 25 (H) 6 - 20 mg/dL    Creatinine 1.2 0.5 - 1.4 mg/dL    Calcium 9.3 8.7 - 10.5 mg/dL    Total Protein 7.0 6.0 - 8.4 g/dL    Albumin 3.5 3.5 - 5.2 g/dL    Total Bilirubin 1.0 0.1 - 1.0 mg/dL    Alkaline Phosphatase 69 55 - 135 U/L     (H) 10 - 40 U/L     (H) 10 - 44 U/L    Anion Gap 14 8 - 16 mmol/L    eGFR if African American >60 >60 mL/min/1.73 m^2    eGFR if non African American >60 >60 mL/min/1.73 m^2   Magnesium    Collection Time: 12/17/21  3:57 AM   Result Value Ref Range    Magnesium 1.9 1.6 - 2.6 mg/dL   Phosphorus     Collection Time: 12/17/21  3:57 AM   Result Value Ref Range    Phosphorus 3.8 2.7 - 4.5 mg/dL   Troponin I    Collection Time: 12/17/21  3:57 AM   Result Value Ref Range    Troponin I 0.020 0.000 - 0.026 ng/mL   CBC with Automated Differential    Collection Time: 12/17/21  3:57 AM   Result Value Ref Range    WBC 6.44 3.90 - 12.70 K/uL    RBC 5.26 4.60 - 6.20 M/uL    Hemoglobin 16.0 14.0 - 18.0 g/dL    Hematocrit 48.7 40.0 - 54.0 %    MCV 93 82 - 98 fL    MCH 30.4 27.0 - 31.0 pg    MCHC 32.9 32.0 - 36.0 g/dL    RDW 13.4 11.5 - 14.5 %    Platelets 202 150 - 450 K/uL    MPV 9.8 9.2 - 12.9 fL    Immature Granulocytes 0.5 0.0 - 0.5 %    Gran # (ANC) 3.4 1.8 - 7.7 K/uL    Immature Grans (Abs) 0.03 0.00 - 0.04 K/uL    Lymph # 2.1 1.0 - 4.8 K/uL    Mono # 0.6 0.3 - 1.0 K/uL    Eos # 0.2 0.0 - 0.5 K/uL    Baso # 0.05 0.00 - 0.20 K/uL    nRBC 0 0 /100 WBC    Gran % 53.2 38.0 - 73.0 %    Lymph % 32.8 18.0 - 48.0 %    Mono % 9.9 4.0 - 15.0 %    Eosinophil % 2.8 0.0 - 8.0 %    Basophil % 0.8 0.0 - 1.9 %    Differential Method Automated    Toxicology screen, urine    Collection Time: 12/17/21  5:35 AM   Result Value Ref Range    Alcohol, Urine <10 <10 mg/dL    Benzodiazepines Negative Negatiive    Methadone metabolites Negative Negative    Cocaine (Metab.) Negative Negative    Opiate Scrn, Ur Negative Negative    Barbiturate Screen, Ur Negative Negative    Amphetamine Screen, Ur Negative Negative    THC Negative Negative    Phencyclidine Negative Negative    Creatinine, Urine 160.1 23.0 - 375.0 mg/dL    Toxicology Information SEE COMMENT      Current Imaging Results:    No orders to display       Assessment and Plan:     Problem List:    Active Diagnoses:    Diagnosis Date Noted POA    PRINCIPAL PROBLEM:  Myocardial injury [I5A] 12/16/2021 Yes    Primary hypertension [I10] 12/16/2021 Yes      Problems Resolved During this Admission:       Assessment and Plan:     1. Chest Pain              12/16/2021: Chest pain for about 40  min that resolved after NTG sl.              ECG reveals T wave inversion V1-V6.              Treat as ACS.              12/17/2021: Plan cardiac cath.              On aspirin 81 mg Q24.              He took apixiban 5 mg in am 12/16/2021: No need for further anticoagulation.              Continue carvedilol and NTG patch.              Do Echo this am.     2. Chronic Anticoagulation              2020: Presented with pulmonary embolism              On apixiban 5 mg Q12.              Held for angiogram.     3. History of Pulmonary Embolism              2020: Presented with pulmonary embolism.              Appears unprovoked.     4. Hypertension              2000: Diagnosed.              At home been on carvedilol 25 mg Q12, amlodipine 2.5 mg Q24, valsartan 320 mg Q24, spironolactone 25 mg q24 and furosemide 40 mg Q24.                    The planned procedure was discussed in detail with the patient and the family members present. Risk, benefit and alternatives were reviewed. All questions answered. Consent was then signed.     If further questions or concerns arise the patient was encouraged to contact me prior to the planned procedure.       VTE Risk Mitigation (From admission, onward)         Ordered     IP VTE LOW RISK PATIENT  Once         12/16/21 1741     Place sequential compression device  Until discontinued         12/16/21 1741                Anjelica Botello MD  Cardiology  [facility])

## 2021-12-17 NOTE — H&P
[facility] Medicine  History & Physical    Patient Name: Claude Griffin  MRN: [id number]  Patient Class: IP- Inpatient  Admission Date: 12/16/2021  Attending Physician: Lora Modi MD   Primary Care Provider: David Terrell MD         Patient information was obtained from patient, past medical records and ER records.     Subjective:     Principal Problem:Myocardial injury    Chief Complaint: No chief complaint on file.       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  Patient had a milder episode earlier this week. Labs WBC 5.8, Hb 16.7, Plts 235, Na 142, K 4.3, CO2 28, Cr 1.5, BNP 23, troponin < 0.05, repeat troponin pending.  COVID neg.  CXR pos for mild PVC.  EKG pos for ST depression in V3-V4 which was new when compared to prior.  Patient given Lasix, sublingual NG and aspirin.  CP has resolved.  The patient was transferred to [facility] for Cardiology consult.      Past Medical History:   Diagnosis Date    Gout age 40    Hypertension        Past Surgical History:   Procedure Laterality Date    NECK SURGERY  2009    Neck fusion       Review of patient's allergies indicates:   Allergen Reactions    Penicillins Other (See Comments)     Reaction unknown- childhood allergy       No current facility-administered medications on file prior to encounter.     Current Outpatient Medications on File Prior to Encounter   Medication Sig    allopurinol (ZYLOPRIM) 300 MG tablet Take 300 mg by mouth once daily.     lisinopril-hydrochlorothiazide (PRINZIDE,ZESTORETIC) 20-25 mg Tab Take 1 tablet by mouth once daily.      Family History     Problem Relation (Age of Onset)    Diabetes Mother    Hypertension Father        Tobacco Use    Smoking status: Never Smoker     Smokeless tobacco: Never Used   Substance and Sexual Activity    Alcohol use: Yes     Comment: only socially- very rare    Drug use: No    Sexual activity: Not on file     Review of Systems   Constitutional: Positive for activity change and fatigue. Negative for appetite change and fever.   HENT: Negative for congestion, ear pain and postnasal drip.    Eyes: Negative for discharge.   Respiratory: Positive for chest tightness. Negative for apnea, shortness of breath and wheezing.    Cardiovascular: Positive for chest pain. Negative for leg swelling.   Gastrointestinal: Negative for abdominal distention, abdominal pain, nausea and vomiting.   Endocrine: Negative for polydipsia, polyphagia and polyuria.   Genitourinary: Negative for difficulty urinating, flank pain, frequency, hematuria and urgency.   Musculoskeletal: Negative for arthralgias and joint swelling.   Skin: Negative for pallor and rash.   Allergic/Immunologic: Negative for environmental allergies and food allergies.   Neurological: Negative for dizziness, speech difficulty, weakness, light-headedness and headaches.   Hematological: Does not bruise/bleed easily.   Psychiatric/Behavioral: Negative for agitation.     Objective:     Vital Signs (Most Recent):  Pulse: 63 (12/16/21 1823)  Resp: 20 (12/16/21 1818)  SpO2: 95 % (12/16/21 1818) Vital Signs (24h Range):  Temp:  [97.2 °F (36.2 °C)-97.8 °F (36.6 °C)] 97.2 °F (36.2 °C)  Pulse:  [58-75] 63  Resp:  [16-20] 20  SpO2:  [95 %-97 %] 95 %  BP: (103-119)/(73-81) 119/81        There is no height or weight on file to calculate BMI.    Physical Exam  Constitutional:       Appearance: Normal appearance. He is well-developed and well-nourished.   HENT:      Head: Normocephalic.   Eyes:      Conjunctiva/sclera: Conjunctivae normal.   Cardiovascular:      Rate and Rhythm: Normal rate and regular rhythm.      Pulses: Intact distal pulses.      Heart sounds: Normal heart sounds.   Pulmonary:      Effort: Pulmonary  effort is normal.      Breath sounds: Normal breath sounds.   Abdominal:      General: Bowel sounds are normal. There is no distension.      Palpations: Abdomen is soft.      Tenderness: There is no abdominal tenderness.   Musculoskeletal:         General: Normal range of motion.      Cervical back: Normal range of motion and neck supple.   Skin:     General: Skin is warm and dry.   Neurological:      Mental Status: He is alert and oriented to person, place, and time.   Psychiatric:         Mood and Affect: Mood and affect normal.         Behavior: Behavior normal.             Significant Labs: All pertinent labs within the past 24 hours have been reviewed.      Significant Imaging: I have reviewed all pertinent imaging results/findings within the past 24 hours.    Assessment/Plan:     * Myocardial injury  Patient reports chest pain, troponin <.005, ST depression in V3,V4    Consult Cardiology  Cardiac monitoring  Trend Troponin  EKG        Primary hypertension  Normotensive currently    Continue valsartan, aldactone, lasix, coreg, amlodipine        VTE Risk Mitigation (From admission, onward)         Ordered     IP VTE LOW RISK PATIENT  Once         12/16/21 1741     Place sequential compression device  Until discontinued         12/16/21 1741                   Uzair Corbin NP  Department of Hospital Medicine   [facility] Surg ([facility])

## 2021-12-17 NOTE — ASSESSMENT & PLAN NOTE
Normotensive currently    Continue valsartan, aldactone, coreg, amlodipine  Home meds show losartan and valsartan

## 2021-12-17 NOTE — SUBJECTIVE & OBJECTIVE
Past Medical History:   Diagnosis Date    Gout age 40    Hypertension        Past Surgical History:   Procedure Laterality Date    NECK SURGERY  2009    Neck fusion       Review of patient's allergies indicates:   Allergen Reactions    Penicillins Other (See Comments)     Reaction unknown- childhood allergy       No current facility-administered medications on file prior to encounter.     Current Outpatient Medications on File Prior to Encounter   Medication Sig    allopurinol (ZYLOPRIM) 300 MG tablet Take 300 mg by mouth once daily.     lisinopril-hydrochlorothiazide (PRINZIDE,ZESTORETIC) 20-25 mg Tab Take 1 tablet by mouth once daily.      Family History     Problem Relation (Age of Onset)    Diabetes Mother    Hypertension Father        Tobacco Use    Smoking status: Never Smoker    Smokeless tobacco: Never Used   Substance and Sexual Activity    Alcohol use: Yes     Comment: only socially- very rare    Drug use: No    Sexual activity: Not on file     Review of Systems   Constitutional: Positive for activity change and fatigue. Negative for appetite change and fever.   HENT: Negative for congestion, ear pain and postnasal drip.    Eyes: Negative for discharge.   Respiratory: Positive for chest tightness. Negative for apnea, shortness of breath and wheezing.    Cardiovascular: Positive for chest pain. Negative for leg swelling.   Gastrointestinal: Negative for abdominal distention, abdominal pain, nausea and vomiting.   Endocrine: Negative for polydipsia, polyphagia and polyuria.   Genitourinary: Negative for difficulty urinating, flank pain, frequency, hematuria and urgency.   Musculoskeletal: Negative for arthralgias and joint swelling.   Skin: Negative for pallor and rash.   Allergic/Immunologic: Negative for environmental allergies and food allergies.   Neurological: Negative for dizziness, speech difficulty, weakness, light-headedness and headaches.   Hematological: Does not bruise/bleed easily.    Psychiatric/Behavioral: Negative for agitation.     Objective:     Vital Signs (Most Recent):  Pulse: 63 (12/16/21 1823)  Resp: 20 (12/16/21 1818)  SpO2: 95 % (12/16/21 1818) Vital Signs (24h Range):  Temp:  [97.2 °F (36.2 °C)-97.8 °F (36.6 °C)] 97.2 °F (36.2 °C)  Pulse:  [58-75] 63  Resp:  [16-20] 20  SpO2:  [95 %-97 %] 95 %  BP: (103-119)/(73-81) 119/81        There is no height or weight on file to calculate BMI.    Physical Exam  Constitutional:       Appearance: Normal appearance. He is well-developed and well-nourished.   HENT:      Head: Normocephalic.   Eyes:      Conjunctiva/sclera: Conjunctivae normal.   Cardiovascular:      Rate and Rhythm: Normal rate and regular rhythm.      Pulses: Intact distal pulses.      Heart sounds: Normal heart sounds.   Pulmonary:      Effort: Pulmonary effort is normal.      Breath sounds: Normal breath sounds.   Abdominal:      General: Bowel sounds are normal. There is no distension.      Palpations: Abdomen is soft.      Tenderness: There is no abdominal tenderness.   Musculoskeletal:         General: Normal range of motion.      Cervical back: Normal range of motion and neck supple.   Skin:     General: Skin is warm and dry.   Neurological:      Mental Status: He is alert and oriented to person, place, and time.   Psychiatric:         Mood and Affect: Mood and affect normal.         Behavior: Behavior normal.             Significant Labs: All pertinent labs within the past 24 hours have been reviewed.      Significant Imaging: I have reviewed all pertinent imaging results/findings within the past 24 hours.

## 2021-12-17 NOTE — SUBJECTIVE & OBJECTIVE
Interval History: Cheat pain better, c/o sob on minimal activity.    Review of Systems   Constitutional: Negative for chills and fever.   HENT: Negative for trouble swallowing.    Respiratory: Positive for shortness of breath. Negative for cough.    Cardiovascular: Positive for chest pain. Negative for leg swelling.   Gastrointestinal: Negative for abdominal pain, blood in stool, nausea and vomiting.   Genitourinary: Negative for dysuria and hematuria.   Musculoskeletal: Negative for gait problem.   Skin: Negative for rash.   Neurological: Negative for headaches.   Psychiatric/Behavioral: Negative for confusion.     Objective:     Vital Signs (Most Recent):  Temp: 97.9 °F (36.6 °C) (12/17/21 1226)  Pulse: 62 (12/17/21 1226)  Resp: 18 (12/17/21 1226)  BP: 113/76 (12/17/21 1226)  SpO2: (!) 94 % (12/17/21 1226) Vital Signs (24h Range):  Temp:  [97.6 °F (36.4 °C)-98.8 °F (37.1 °C)] 97.9 °F (36.6 °C)  Pulse:  [56-77] 62  Resp:  [17-20] 18  SpO2:  [93 %-99 %] 94 %  BP: ()/(60-80) 113/76     Weight: 81.2 kg (179 lb)  Body mass index is 28.89 kg/m².    Intake/Output Summary (Last 24 hours) at 12/17/2021 1351  Last data filed at 12/17/2021 1100  Gross per 24 hour   Intake 770 ml   Output 1175 ml   Net -405 ml      Physical Exam  Vitals reviewed.   Constitutional:       General: He is not in acute distress.     Appearance: He is well-developed.   HENT:      Head: Normocephalic and atraumatic.   Eyes:      Extraocular Movements: Extraocular movements intact.      Pupils: Pupils are equal, round, and reactive to light.   Cardiovascular:      Rate and Rhythm: Normal rate and regular rhythm.   Pulmonary:      Effort: Pulmonary effort is normal. No respiratory distress.      Breath sounds: Normal breath sounds.   Abdominal:      General: Bowel sounds are normal. There is no distension.      Palpations: Abdomen is soft.      Tenderness: There is no abdominal tenderness.   Musculoskeletal:         General: No swelling. Normal  range of motion.      Cervical back: Normal range of motion and neck supple.   Skin:     General: Skin is warm.      Findings: No rash.   Neurological:      General: No focal deficit present.      Mental Status: He is alert and oriented to person, place, and time.   Psychiatric:         Mood and Affect: Mood normal.         Behavior: Behavior normal.         Significant Labs: All pertinent labs within the past 24 hours have been reviewed.    Significant Imaging: I have reviewed all pertinent imaging results/findings within the past 24 hours.

## 2021-12-18 PROBLEM — I25.10 CORONARY ARTERY DISEASE: Status: ACTIVE | Noted: 2021-12-18

## 2021-12-18 PROBLEM — Z79.01 CHRONIC ANTICOAGULATION: Status: ACTIVE | Noted: 2021-12-18

## 2021-12-18 LAB
ALBUMIN SERPL BCP-MCNC: 3.5 G/DL (ref 3.5–5.2)
ALP SERPL-CCNC: 69 U/L (ref 55–135)
ALT SERPL W/O P-5'-P-CCNC: 141 U/L (ref 10–44)
ANION GAP SERPL CALC-SCNC: 12 MMOL/L (ref 8–16)
ANION GAP SERPL CALC-SCNC: 12 MMOL/L (ref 8–16)
APTT BLDCRRT: 41.7 SEC (ref 21–32)
APTT BLDCRRT: 42.3 SEC (ref 21–32)
AST SERPL-CCNC: 83 U/L (ref 10–40)
BASOPHILS # BLD AUTO: 0.04 K/UL (ref 0–0.2)
BASOPHILS NFR BLD: 0.7 % (ref 0–1.9)
BILIRUB SERPL-MCNC: 1.4 MG/DL (ref 0.1–1)
BUN SERPL-MCNC: 21 MG/DL (ref 6–20)
BUN SERPL-MCNC: 21 MG/DL (ref 6–20)
CALCIUM SERPL-MCNC: 9 MG/DL (ref 8.7–10.5)
CALCIUM SERPL-MCNC: 9 MG/DL (ref 8.7–10.5)
CHLORIDE SERPL-SCNC: 104 MMOL/L (ref 95–110)
CHLORIDE SERPL-SCNC: 104 MMOL/L (ref 95–110)
CO2 SERPL-SCNC: 23 MMOL/L (ref 23–29)
CO2 SERPL-SCNC: 23 MMOL/L (ref 23–29)
CREAT SERPL-MCNC: 1.1 MG/DL (ref 0.5–1.4)
CREAT SERPL-MCNC: 1.1 MG/DL (ref 0.5–1.4)
DIFFERENTIAL METHOD: ABNORMAL
EOSINOPHIL # BLD AUTO: 0.2 K/UL (ref 0–0.5)
EOSINOPHIL NFR BLD: 3.4 % (ref 0–8)
ERYTHROCYTE [DISTWIDTH] IN BLOOD BY AUTOMATED COUNT: 13.4 % (ref 11.5–14.5)
EST. GFR  (AFRICAN AMERICAN): >60 ML/MIN/1.73 M^2
EST. GFR  (AFRICAN AMERICAN): >60 ML/MIN/1.73 M^2
EST. GFR  (NON AFRICAN AMERICAN): >60 ML/MIN/1.73 M^2
EST. GFR  (NON AFRICAN AMERICAN): >60 ML/MIN/1.73 M^2
GLUCOSE SERPL-MCNC: 99 MG/DL (ref 70–110)
GLUCOSE SERPL-MCNC: 99 MG/DL (ref 70–110)
HCT VFR BLD AUTO: 45.5 % (ref 40–54)
HGB BLD-MCNC: 14.9 G/DL (ref 14–18)
HIV 1+2 AB+HIV1 P24 AG SERPL QL IA: NEGATIVE
IMM GRANULOCYTES # BLD AUTO: 0.03 K/UL (ref 0–0.04)
IMM GRANULOCYTES NFR BLD AUTO: 0.5 % (ref 0–0.5)
LYMPHOCYTES # BLD AUTO: 2.2 K/UL (ref 1–4.8)
LYMPHOCYTES NFR BLD: 38.1 % (ref 18–48)
MAGNESIUM SERPL-MCNC: 2.1 MG/DL (ref 1.6–2.6)
MCH RBC QN AUTO: 30.3 PG (ref 27–31)
MCHC RBC AUTO-ENTMCNC: 32.7 G/DL (ref 32–36)
MCV RBC AUTO: 93 FL (ref 82–98)
MONOCYTES # BLD AUTO: 0.6 K/UL (ref 0.3–1)
MONOCYTES NFR BLD: 11 % (ref 4–15)
NEUTROPHILS # BLD AUTO: 2.7 K/UL (ref 1.8–7.7)
NEUTROPHILS NFR BLD: 46.3 % (ref 38–73)
NRBC BLD-RTO: 0 /100 WBC
PHOSPHATE SERPL-MCNC: 4.1 MG/DL (ref 2.7–4.5)
PLATELET # BLD AUTO: 190 K/UL (ref 150–450)
PMV BLD AUTO: 9 FL (ref 9.2–12.9)
POTASSIUM SERPL-SCNC: 4.5 MMOL/L (ref 3.5–5.1)
POTASSIUM SERPL-SCNC: 4.5 MMOL/L (ref 3.5–5.1)
PROT SERPL-MCNC: 6.6 G/DL (ref 6–8.4)
RBC # BLD AUTO: 4.91 M/UL (ref 4.6–6.2)
SODIUM SERPL-SCNC: 139 MMOL/L (ref 136–145)
SODIUM SERPL-SCNC: 139 MMOL/L (ref 136–145)
WBC # BLD AUTO: 5.83 K/UL (ref 3.9–12.7)

## 2021-12-18 PROCEDURE — 25000003 PHARM REV CODE 250: Performed by: NURSE PRACTITIONER

## 2021-12-18 PROCEDURE — 83735 ASSAY OF MAGNESIUM: CPT | Performed by: NURSE PRACTITIONER

## 2021-12-18 PROCEDURE — 80053 COMPREHEN METABOLIC PANEL: CPT | Performed by: NURSE PRACTITIONER

## 2021-12-18 PROCEDURE — 84100 ASSAY OF PHOSPHORUS: CPT | Performed by: NURSE PRACTITIONER

## 2021-12-18 PROCEDURE — 86038 ANTINUCLEAR ANTIBODIES: CPT | Performed by: STUDENT IN AN ORGANIZED HEALTH CARE EDUCATION/TRAINING PROGRAM

## 2021-12-18 PROCEDURE — 87389 HIV-1 AG W/HIV-1&-2 AB AG IA: CPT | Performed by: STUDENT IN AN ORGANIZED HEALTH CARE EDUCATION/TRAINING PROGRAM

## 2021-12-18 PROCEDURE — 99233 PR SUBSEQUENT HOSPITAL CARE,LEVL III: ICD-10-PCS | Mod: ,,, | Performed by: INTERNAL MEDICINE

## 2021-12-18 PROCEDURE — 25500020 PHARM REV CODE 255: Performed by: INTERNAL MEDICINE

## 2021-12-18 PROCEDURE — 94761 N-INVAS EAR/PLS OXIMETRY MLT: CPT

## 2021-12-18 PROCEDURE — 27000221 HC OXYGEN, UP TO 24 HOURS

## 2021-12-18 PROCEDURE — 85730 THROMBOPLASTIN TIME PARTIAL: CPT | Performed by: INTERNAL MEDICINE

## 2021-12-18 PROCEDURE — 99233 SBSQ HOSP IP/OBS HIGH 50: CPT | Mod: ,,, | Performed by: INTERNAL MEDICINE

## 2021-12-18 PROCEDURE — 21400001 HC TELEMETRY ROOM

## 2021-12-18 PROCEDURE — 25000003 PHARM REV CODE 250: Performed by: INTERNAL MEDICINE

## 2021-12-18 PROCEDURE — 86431 RHEUMATOID FACTOR QUANT: CPT | Performed by: STUDENT IN AN ORGANIZED HEALTH CARE EDUCATION/TRAINING PROGRAM

## 2021-12-18 PROCEDURE — 93005 ELECTROCARDIOGRAM TRACING: CPT

## 2021-12-18 PROCEDURE — 36415 COLL VENOUS BLD VENIPUNCTURE: CPT | Performed by: STUDENT IN AN ORGANIZED HEALTH CARE EDUCATION/TRAINING PROGRAM

## 2021-12-18 PROCEDURE — 36415 COLL VENOUS BLD VENIPUNCTURE: CPT | Performed by: INTERNAL MEDICINE

## 2021-12-18 PROCEDURE — 85025 COMPLETE CBC W/AUTO DIFF WBC: CPT | Performed by: NURSE PRACTITIONER

## 2021-12-18 PROCEDURE — 99900035 HC TECH TIME PER 15 MIN (STAT)

## 2021-12-18 PROCEDURE — 63600175 PHARM REV CODE 636 W HCPCS: Performed by: INTERNAL MEDICINE

## 2021-12-18 RX ADMIN — ACETAMINOPHEN 650 MG: 325 TABLET, FILM COATED ORAL at 09:12

## 2021-12-18 RX ADMIN — HEPARIN SODIUM 18 UNITS/KG/HR: 10000 INJECTION, SOLUTION INTRAVENOUS at 10:12

## 2021-12-18 RX ADMIN — ATORVASTATIN CALCIUM 40 MG: 20 TABLET, FILM COATED ORAL at 09:12

## 2021-12-18 RX ADMIN — ACETAMINOPHEN 650 MG: 325 TABLET, FILM COATED ORAL at 12:12

## 2021-12-18 RX ADMIN — IOHEXOL 100 ML: 350 INJECTION, SOLUTION INTRAVENOUS at 02:12

## 2021-12-18 RX ADMIN — ALLOPURINOL 300 MG: 300 TABLET ORAL at 09:12

## 2021-12-18 NOTE — HOSPITAL COURSE
Had lhc on 12/17/21 which showed LAD: Mid 30%, RHC: PA: 54/24. Mean 35.Patients bp dropped and his bp meds were held.Cta chest did not show pulmonary embolism, showed enlarged main pulmonary artery consistent with but not specific for pulmonary arterial hypertension. He will need to follow up with pulm htn specialists at [place].

## 2021-12-18 NOTE — PROGRESS NOTES
[facility] Surg [facility]  Cardiology  Progress Note    Patient Name: Claude Griffin  MRN: [id number]  Admission Date: 12/16/2021  Hospital Length of Stay: 2 days  Code Status: Full Code   Attending Physician: Salima Chu MD   Primary Care Physician: David Terrell MD  Expected Discharge Date:   Principal Problem:Chest pain    Subjective:     Brief HPI:    Claude Griffin is a 56 y.o.male with hypertension. He lives in [place], [place]. He had pulmonary emboli in early 2020. He was treated at [facility]. He has been anticoagulated with apixiban since. In about 2/2021 he was diagnosed with heart failure and he was admitted to [facility] for a few days. 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 He took it easy for a few days. On 12/16/2021 he was doing lighter clean up work when he began sweating, his chest began aching and he felt short of breath. He presented to [facility] in [place]. He was given NTG and the pain mostly subsided. It was arranged for him to be transferred to [facility] for his further care.        Hospital Course:     12/17/2021: Echo: Normal left ventricular size and systolic function. EF 60%. Moderate diastolic dysfunction. Severely enlarged right ventricle with severe systolic dysfunction. SPAP 66 mmHg.    12/17/2021: [facility]: Cath: LAD: Mid 30%. RHC: PA: 54/24. Mean 35.    Interval History:    Comfortable.    Denies chest pain or SOB.      Review of Systems   Constitutional: Negative for chills, fever and malaise/fatigue.   HENT: Negative for nosebleeds.    Eyes: Negative for vision loss in left eye and vision loss in right eye.   Cardiovascular: Negative for chest pain, leg swelling, orthopnea, palpitations and paroxysmal nocturnal dyspnea.   Respiratory: Negative for cough, hemoptysis, shortness of breath, sputum production and wheezing.    Hematologic/Lymphatic: Negative for  bleeding problem. Does not bruise/bleed easily.   Skin: Negative for color change and rash.   Musculoskeletal: Negative for muscle weakness and myalgias.   Gastrointestinal: Negative for abdominal pain, heartburn, hematemesis, hematochezia, melena, nausea and vomiting.   Genitourinary: Negative for hematuria.   Neurological: Negative for dizziness, focal weakness, headaches, light-headedness, vertigo and weakness.   Psychiatric/Behavioral: Negative for altered mental status. The patient is not nervous/anxious.    Allergic/Immunologic: Negative for persistent infections.       Objective:     Vital Signs (Most Recent):  Temp: 97.9 °F (36.6 °C) (12/18/21 1236)  Pulse: 71 (12/18/21 1236)  Resp: 18 (12/18/21 1236)  BP: 121/71 (12/18/21 1236)  SpO2: 96 % (12/18/21 1236) Vital Signs (24h Range):  Temp:  [96.3 °F (35.7 °C)-97.9 °F (36.6 °C)] 97.9 °F (36.6 °C)  Pulse:  [59-80] 71  Resp:  [17-18] 18  SpO2:  [93 %-100 %] 96 %  BP: ()/(55-81) 121/71     Weight: 81.2 kg (179 lb)  Body mass index is 28.89 kg/m².    SpO2: 96 %  O2 Device (Oxygen Therapy): nasal cannula      Intake/Output Summary (Last 24 hours) at 12/18/2021 1407  Last data filed at 12/18/2021 0600  Gross per 24 hour   Intake 1052.12 ml   Output 725 ml   Net 327.12 ml       Lines/Drains/Airways     Peripheral Intravenous Line                 Peripheral IV - Single Lumen 12/17/21 1528 20 G Anterior;Left;Proximal Forearm <1 day         Peripheral IV - Single Lumen 12/18/21 0000 20 G Left;Anterior;Proximal Forearm <1 day                Physical Exam  Constitutional:       General: He is not in acute distress.     Appearance: Normal appearance. He is well-developed. He is not ill-appearing.   Eyes:      Conjunctiva/sclera:      Right eye: Right conjunctiva is not injected. No hemorrhage.     Left eye: Left conjunctiva is not injected. No hemorrhage.     Pupils:      Right eye: Pupil is round.      Left eye: Pupil is round.   Neck:      Vascular: No JVD.    Cardiovascular:      Rate and Rhythm: Normal rate and regular rhythm.      Heart sounds: S1 normal and S2 normal. No murmur heard.  Gallop present. S4 sounds present. No S3 sounds.    Pulmonary:      Effort: Pulmonary effort is normal.      Breath sounds: Normal breath sounds.   Chest:      Chest wall: No swelling or tenderness.   Abdominal:      General: There is no distension.      Palpations: Abdomen is soft.      Tenderness: There is no abdominal tenderness.   Musculoskeletal:      Cervical back: Neck supple.      Right ankle: No swelling.      Left ankle: No swelling.   Skin:     General: Skin is warm and dry.      Findings: No rash.   Neurological:      Mental Status: He is alert and oriented to person, place, and time. He is not disoriented.   Psychiatric:         Attention and Perception: Attention normal.         Mood and Affect: Mood normal.         Speech: Speech normal.         Behavior: Behavior normal.         Thought Content: Thought content normal.         Cognition and Memory: Cognition and memory normal.         Judgment: Judgment normal.         Current Medications:     allopurinoL  300 mg Oral Daily    atorvastatin  40 mg Oral Daily     Current Laboratory Results:    Recent Results (from the past 24 hour(s))   APTT    Collection Time: 12/17/21  7:59 PM   Result Value Ref Range    aPTT 28.4 21.0 - 32.0 sec   Protime-INR    Collection Time: 12/17/21  7:59 PM   Result Value Ref Range    Prothrombin Time 10.9 9.0 - 12.5 sec    INR 1.0 0.8 - 1.2   Brain natriuretic peptide    Collection Time: 12/17/21  7:59 PM   Result Value Ref Range    BNP 13 0 - 99 pg/mL   D dimer, quantitative    Collection Time: 12/17/21  7:59 PM   Result Value Ref Range    D-Dimer <0.19 <0.50 mg/L FEU   CBC auto differential    Collection Time: 12/17/21 10:33 PM   Result Value Ref Range    WBC 7.44 3.90 - 12.70 K/uL    RBC 5.68 4.60 - 6.20 M/uL    Hemoglobin 17.5 14.0 - 18.0 g/dL    Hematocrit 52.7 40.0 - 54.0 %    MCV 93 82  - 98 fL    MCH 30.8 27.0 - 31.0 pg    MCHC 33.2 32.0 - 36.0 g/dL    RDW 13.5 11.5 - 14.5 %    Platelets 230 150 - 450 K/uL    MPV 9.7 9.2 - 12.9 fL    Immature Granulocytes 0.4 0.0 - 0.5 %    Gran # (ANC) 4.0 1.8 - 7.7 K/uL    Immature Grans (Abs) 0.03 0.00 - 0.04 K/uL    Lymph # 2.6 1.0 - 4.8 K/uL    Mono # 0.6 0.3 - 1.0 K/uL    Eos # 0.2 0.0 - 0.5 K/uL    Baso # 0.06 0.00 - 0.20 K/uL    nRBC 0 0 /100 WBC    Gran % 53.3 38.0 - 73.0 %    Lymph % 35.3 18.0 - 48.0 %    Mono % 8.2 4.0 - 15.0 %    Eosinophil % 2.0 0.0 - 8.0 %    Basophil % 0.8 0.0 - 1.9 %    Differential Method Automated    Comprehensive Metabolic Panel (CMP)    Collection Time: 12/18/21  5:17 AM   Result Value Ref Range    Sodium 139 136 - 145 mmol/L    Potassium 4.5 3.5 - 5.1 mmol/L    Chloride 104 95 - 110 mmol/L    CO2 23 23 - 29 mmol/L    Glucose 99 70 - 110 mg/dL    BUN 21 (H) 6 - 20 mg/dL    Creatinine 1.1 0.5 - 1.4 mg/dL    Calcium 9.0 8.7 - 10.5 mg/dL    Total Protein 6.6 6.0 - 8.4 g/dL    Albumin 3.5 3.5 - 5.2 g/dL    Total Bilirubin 1.4 (H) 0.1 - 1.0 mg/dL    Alkaline Phosphatase 69 55 - 135 U/L    AST 83 (H) 10 - 40 U/L     (H) 10 - 44 U/L    Anion Gap 12 8 - 16 mmol/L    eGFR if African American >60 >60 mL/min/1.73 m^2    eGFR if non African American >60 >60 mL/min/1.73 m^2   Magnesium    Collection Time: 12/18/21  5:17 AM   Result Value Ref Range    Magnesium 2.1 1.6 - 2.6 mg/dL   Phosphorus    Collection Time: 12/18/21  5:17 AM   Result Value Ref Range    Phosphorus 4.1 2.7 - 4.5 mg/dL   CBC with Automated Differential    Collection Time: 12/18/21  5:17 AM   Result Value Ref Range    WBC 5.83 3.90 - 12.70 K/uL    RBC 4.91 4.60 - 6.20 M/uL    Hemoglobin 14.9 14.0 - 18.0 g/dL    Hematocrit 45.5 40.0 - 54.0 %    MCV 93 82 - 98 fL    MCH 30.3 27.0 - 31.0 pg    MCHC 32.7 32.0 - 36.0 g/dL    RDW 13.4 11.5 - 14.5 %    Platelets 190 150 - 450 K/uL    MPV 9.0 (L) 9.2 - 12.9 fL    Immature Granulocytes 0.5 0.0 - 0.5 %    Gran # (ANC) 2.7 1.8  - 7.7 K/uL    Immature Grans (Abs) 0.03 0.00 - 0.04 K/uL    Lymph # 2.2 1.0 - 4.8 K/uL    Mono # 0.6 0.3 - 1.0 K/uL    Eos # 0.2 0.0 - 0.5 K/uL    Baso # 0.04 0.00 - 0.20 K/uL    nRBC 0 0 /100 WBC    Gran % 46.3 38.0 - 73.0 %    Lymph % 38.1 18.0 - 48.0 %    Mono % 11.0 4.0 - 15.0 %    Eosinophil % 3.4 0.0 - 8.0 %    Basophil % 0.7 0.0 - 1.9 %    Differential Method Automated    Basic metabolic panel    Collection Time: 12/18/21  5:17 AM   Result Value Ref Range    Sodium 139 136 - 145 mmol/L    Potassium 4.5 3.5 - 5.1 mmol/L    Chloride 104 95 - 110 mmol/L    CO2 23 23 - 29 mmol/L    Glucose 99 70 - 110 mg/dL    BUN 21 (H) 6 - 20 mg/dL    Creatinine 1.1 0.5 - 1.4 mg/dL    Calcium 9.0 8.7 - 10.5 mg/dL    Anion Gap 12 8 - 16 mmol/L    eGFR if African American >60 >60 mL/min/1.73 m^2    eGFR if non African American >60 >60 mL/min/1.73 m^2   APTT    Collection Time: 12/18/21  5:17 AM   Result Value Ref Range    aPTT 41.7 (H) 21.0 - 32.0 sec   APTT    Collection Time: 12/18/21 10:35 AM   Result Value Ref Range    aPTT 42.3 (H) 21.0 - 32.0 sec     Current Imaging Results:    CTA Chest Non-Coronary(PE Studies)    (Results Pending)       12/17/2021: Echo:     The left ventricle is normal in size with normal systolic function.  The estimated ejection fraction is 60%.  Grade II left ventricular diastolic dysfunction.  There is abnormal septal wall motion. There is systolic flattening of the interventricular septum consistent with right ventricle pressure overload.  Moderate left atrial enlargement.  Severe right ventricular enlargement with severely reduced right ventricular systolic function.  Severe right atrial enlargement.  Mild mitral regurgitation.  Mild tricuspid regurgitation.  There is moderate pulmonary hypertension.  The estimated PA systolic pressure is 66 mmHg.  Intermediate central venous pressure (8 mmHg).      Assessment and Plan:     Problem List:    Active Diagnoses:    Diagnosis Date Noted POA     PRINCIPAL PROBLEM:  Chest pain [R07.9] 12/16/2021 Yes    Chronic pulmonary embolism [I27.82] 12/17/2021 Yes    CHF (congestive heart failure) [I50.9] 12/17/2021 Yes    Primary hypertension [I10] 12/16/2021 Yes      Problems Resolved During this Admission:       Assessment and Plan:    1. Coronary Artery Disease   12/17/2021: [facility]: Cath: LAD: Mid 30%. RHC: PA: 54/24. Mean 35.   12/17/2021: Chol 197. HDL 30. . .   On atorvastatin 40 mg Q24.   Medical therapy.   No aspirin as anticoagulated.    2. Chest Pain              12/16/2021: Chest pain for about 40 min that resolved after NTG sl.              ECG reveals T wave inversion V1-V6.          Possbly explained by pulmonary hypertension.     3. Pulmonary Hypertension   2020: Presented with pulmonary embolism   12/17/2021: Echo: Normal left ventricular size and systolic function. EF 60%. Moderate diastolic dysfunction. Severely enlarged right ventricle with severe systolic dysfunction. SPAP 66 mmHg.   12/17/2021: [facility]: Cath: RHC: PA: 54/24. Mean 35.    PCWP was not done as felt CTA should be completed.   Concern about chronic thromboembolic disease.   To have CTA today.     4. Chronic Anticoagulation              2020: Presented with pulmonary embolism              On apixiban 5 mg Q12.              Says he has been 100% compliant since the PE.   Now on heparin iv.     4. History of Pulmonary Embolism              2020: Presented with pulmonary embolism.              Appears unprovoked.     5. Hypertension              2000: Diagnosed.              At home been on carvedilol 25 mg Q12, amlodipine 2.5 mg Q24, valsartan 320 mg Q24, spironolactone 25 mg Q24 and furosemide 40 mg Q24.    Medications on hold due to hypotension at time of cath.    6. Overweight   12/18/2021: Weight 81 kg. BMI 29.   Encouraged to lose weight.         VTE Risk Mitigation (From admission, onward)         Ordered     heparin 25,000 units in dextrose 5% (100 units/ml) IV bolus  from bag - ADDITIONAL PRN BOLUS - 60 units/kg  As needed (PRN)        Question:  Heparin Infusion Adjustment (DO NOT MODIFY ANSWER)  Answer:  [email or web address]\epic\Images\Pharmacy\HeparinInfusions\heparin HIGH INTENSITY nomogram for [facility] [id number]    12/17/21 1854     heparin 25,000 units in dextrose 5% (100 units/ml) IV bolus from bag - ADDITIONAL PRN BOLUS - 30 units/kg  As needed (PRN)        Question:  Heparin Infusion Adjustment (DO NOT MODIFY ANSWER)  Answer:  [email or web address]\epic\Images\Pharmacy\HeparinInfusions\heparin HIGH INTENSITY nomogram for [facility] [id number]    12/17/21 1854     heparin 25,000 units in dextrose 5% 250 mL (100 units/mL) infusion HIGH INTENSITY nomogram - [facility]  Continuous        Question Answer Comment   Heparin Infusion Adjustment (DO NOT MODIFY ANSWER) [email or web address]\epic\Images\Pharmacy\HeparinInfusions\heparin HIGH INTENSITY nomogram for [facility] [id number].pdf    Begin at (in units/kg/hr) 18        12/17/21 1854     IP VTE LOW RISK PATIENT  Once         12/16/21 1741     Place sequential compression device  Until discontinued         12/16/21 1741                Anjelica Botello MD  Cardiology  [facility] ([facility])

## 2021-12-18 NOTE — ASSESSMENT & PLAN NOTE
Patient reports chest pain, troponin <.005, ST depression in V3,V4    Consulted Cardiology,[facility] showed non obs  cad  Cardiac monitoring  Trend Troponin, negative  On statin    Results for orders placed during the hospital encounter of 12/16/21    Echo Saline Bubble? No    Interpretation Summary  · The left ventricle is normal in size with normal systolic function.  · The estimated ejection fraction is 60%.  · Grade II left ventricular diastolic dysfunction.  · There is abnormal septal wall motion. There is systolic flattening of the interventricular septum consistent with right ventricle pressure overload.  · Moderate left atrial enlargement.  · Severe right ventricular enlargement with severely reduced right ventricular systolic function.  · Severe right atrial enlargement.  · Mild mitral regurgitation.  · Mild tricuspid regurgitation.  · There is moderate pulmonary hypertension.  · The estimated PA systolic pressure is 66 mmHg.  · Intermediate central venous pressure (8 mmHg).

## 2021-12-18 NOTE — OR NURSING
Remaining 1 ml of air removed from TR band. No bleeding, hematoma or bruising noted to site. Placed tegaderm over site. Called report to JERZY Barrera.

## 2021-12-18 NOTE — ASSESSMENT & PLAN NOTE
Normotensive currently    Continue valsartan, aldactone, coreg, amlodipine  Home meds show losartan and valsartan  Holding as bp dropped after procedure, currently normal

## 2021-12-18 NOTE — ASSESSMENT & PLAN NOTE
Diagnosed a year ago.  Right heart failure  Fluid status looks ok  Holding lasix, aldactone with low bp

## 2021-12-18 NOTE — ASSESSMENT & PLAN NOTE
Diagnosed a year ago  On eliquis at home  Transitioned to heparin drip  Ddimer, bnp normal  cta chest today to follow on pulmonary embolism/lung parenchyma with pulmonary htn and reduced right ventricular function on echo

## 2021-12-18 NOTE — PROGRESS NOTES
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  Patient had a milder episode earlier this week. Labs WBC 5.8, Hb 16.7, Plts 235, Na 142, K 4.3, CO2 28, Cr 1.5, BNP 23, troponin < 0.05, repeat troponin pending.  COVID neg.  CXR pos for mild PVC.  EKG pos for ST depression in V3-V4 which was new when compared to prior.  Patient given Lasix, sublingual NG and aspirin.  CP has resolved.  The patient was transferred to [facility] for Cardiology consult.      Overview/Hospital Course:  Had [facility] on 12/17/21 which showed LAD: Mid 30%, RHC: PA: 54/24. Mean 35.      Interval History: Chest pain better, c/o sob on exertion, improved from admit.    Review of Systems   Constitutional: Negative for chills and fever.   HENT: Negative for trouble swallowing.    Respiratory: Positive for shortness of breath. Negative for cough.    Cardiovascular: Positive for chest pain. Negative for leg swelling.   Gastrointestinal: Negative for abdominal pain, blood in stool, nausea and vomiting.   Genitourinary: Negative for dysuria and hematuria.   Musculoskeletal: Negative for gait problem.   Skin: Negative for rash.   Neurological: Negative for headaches.   Psychiatric/Behavioral: Negative for confusion.     Objective:     Vital Signs (Most Recent):  Temp: 97.9 °F (36.6 °C) (12/18/21 1236)  Pulse: 71 (12/18/21 1236)  Resp: 18 (12/18/21 1236)  BP: 121/71 (12/18/21 1236)  SpO2: 96 % (12/18/21 1236)  Vital Signs (24h Range):  Temp:  [96.3 °F (35.7 °C)-97.9 °F (36.6 °C)] 97.9 °F (36.6 °C)  Pulse:  [57-80] 71  Resp:  [17-18] 18  SpO2:  [93 %-100 %] 96 %  BP: ()/(55-81) 121/71     Weight: 81.2 kg (179 lb)  Body mass index is 28.89 kg/m².    Intake/Output Summary (Last 24 hours) at 12/18/2021 1356  Last data filed at 12/18/2021 0600  Gross per 24 hour   Intake 1052.12 ml   Output 725 ml   Net 327.12 ml      Physical Exam  Vitals reviewed.   Constitutional:       General: He is not in acute distress.     Appearance: He is well-developed.   HENT:      Head: Normocephalic and atraumatic.   Eyes:      Extraocular Movements: Extraocular movements intact.      Pupils: Pupils are equal, round, and reactive to light.   Cardiovascular:      Rate and Rhythm: Normal rate and regular rhythm.   Pulmonary:      Effort: Pulmonary effort is normal. No respiratory distress.      Breath sounds: Normal breath sounds.   Abdominal:      General: Bowel sounds are normal. There is no distension.      Palpations: Abdomen is soft.      Tenderness: There is no abdominal tenderness.   Musculoskeletal:         General: No swelling. Normal range of motion.      Cervical back: Normal range of motion and neck supple.   Skin:     General: Skin is warm.      Findings: No rash.   Neurological:      General: No focal deficit present.      Mental Status: He is alert and oriented to person, place, and time.   Psychiatric:         Mood and Affect: Mood normal.         Behavior: Behavior normal.         Significant Labs: All pertinent labs within the past 24 hours have been reviewed.    Significant Imaging: I have reviewed all pertinent imaging results/findings within the past 24 hours.      Assessment/Plan:      * Chest pain  Patient reports chest pain, troponin <.005, ST depression in V3,V4    Consulted Cardiology,[facility] showed non obs  cad  Cardiac monitoring  Trend Troponin, negative  On statin    Results for orders placed during the hospital  encounter of 12/16/21    Echo Saline Bubble? No    Interpretation Summary  · The left ventricle is normal in size with normal systolic function.  · The estimated ejection fraction is 60%.  · Grade II left ventricular diastolic dysfunction.  · There is abnormal septal wall motion. There is systolic flattening of the interventricular septum consistent with right ventricle pressure overload.  · Moderate left atrial enlargement.  · Severe right ventricular enlargement with severely reduced right ventricular systolic function.  · Severe right atrial enlargement.  · Mild mitral regurgitation.  · Mild tricuspid regurgitation.  · There is moderate pulmonary hypertension.  · The estimated PA systolic pressure is 66 mmHg.  · Intermediate central venous pressure (8 mmHg).          CHF (congestive heart failure)  Diagnosed a year ago.  Right heart failure  Fluid status looks ok  Holding lasix, aldactone with low bp        Chronic pulmonary embolism  Diagnosed a year ago  On eliquis at home  Transitioned to heparin drip  Ddimer, bnp normal  cta chest today to follow on pulmonary embolism/lung parenchyma with pulmonary htn and reduced right ventricular function on echo      Primary hypertension  Normotensive currently    Continue valsartan, aldactone, coreg, amlodipine  Home meds show losartan and valsartan  Holding as bp dropped after procedure, currently normal          VTE Risk Mitigation (From admission, onward)         Ordered     heparin 25,000 units in dextrose 5% (100 units/ml) IV bolus from bag - ADDITIONAL PRN BOLUS - 60 units/kg  As needed (PRN)        Question:  Heparin Infusion Adjustment (DO NOT MODIFY ANSWER)  Answer:  [email or web address]\epic\Images\Pharmacy\HeparinInfusions\heparin HIGH INTENSITY nomogram for [facility] [id number]    12/17/21 0782     heparin 25,000 units in dextrose 5% (100 units/ml) IV bolus from bag - ADDITIONAL PRN BOLUS - 30 units/kg  As needed (PRN)        Question:  Heparin Infusion Adjustment (DO NOT  MODIFY ANSWER)  Answer:  [email or web address]\epic\Images\Pharmacy\HeparinInfusions\heparin HIGH INTENSITY nomogram for [facility] [id number].pdf    12/17/21 1854     heparin 25,000 units in dextrose 5% 250 mL (100 units/mL) infusion HIGH INTENSITY nomogram - [facility]  Continuous        Question Answer Comment   Heparin Infusion Adjustment (DO NOT MODIFY ANSWER) [email or web address]\epic\Images\Pharmacy\HeparinInfusions\heparin HIGH INTENSITY nomogram for [facility] [id number]    Begin at (in units/kg/hr) 18        12/17/21 1854     IP VTE LOW RISK PATIENT  Once         12/16/21 1741     Place sequential compression device  Until discontinued         12/16/21 1741                Discharge Planning   DOMENIC:      Code Status: Full Code   Is the patient medically ready for discharge?:     Reason for patient still in hospital (select all that apply): Patient trending condition and Treatment                     Salima hCu MD  Department of Hospital Medicine   [facility] - [facility] Surg ([facility]

## 2021-12-18 NOTE — PLAN OF CARE
Patient away for imaging at time I tried to interview/examine.     Upon chart review, appears patient has pulmonary hypertension with associated RV dysfunction. LHC/RHC performed on 12/17 showing nonischemic left heart disease, on RHC mPAP 35 consistent with PH. No CO/PCWP/LAP measured so unable to calculate pulmonary vascular resistance to help further classify etiology of PH. Could be from several possibilities--right off the bat based on chart review, Group I PAH 2/2 amphetamine use and Group 4 PH 2/2 CTEPH given history of PE are in the realm of possibilities. Multiple other etiologies possible and will be able to clear up pending interview, imaging, and additional lab work.     - will attempt interview/examine at later time  - await CT PE chest to r/o acute PE and examine for parenchymal lung disease  - long term would actually need VQ scan to truly check for CTEPH, at this time, do not need to be performed inpatient, will await CT PE results prior to additional imaging recommendations.   - check HIV and KISHA  - BNP wnl.     Natalia Chand MD  Pulmonary / Critical Care Fellow, PGY VI  12/18/2021  2:00 PM

## 2021-12-18 NOTE — SUBJECTIVE & OBJECTIVE
Interval History: Chest pain better, c/o sob on exertion, improved from admit.    Review of Systems   Constitutional: Negative for chills and fever.   HENT: Negative for trouble swallowing.    Respiratory: Positive for shortness of breath. Negative for cough.    Cardiovascular: Positive for chest pain. Negative for leg swelling.   Gastrointestinal: Negative for abdominal pain, blood in stool, nausea and vomiting.   Genitourinary: Negative for dysuria and hematuria.   Musculoskeletal: Negative for gait problem.   Skin: Negative for rash.   Neurological: Negative for headaches.   Psychiatric/Behavioral: Negative for confusion.     Objective:     Vital Signs (Most Recent):  Temp: 97.9 °F (36.6 °C) (12/18/21 1236)  Pulse: 71 (12/18/21 1236)  Resp: 18 (12/18/21 1236)  BP: 121/71 (12/18/21 1236)  SpO2: 96 % (12/18/21 1236) Vital Signs (24h Range):  Temp:  [96.3 °F (35.7 °C)-97.9 °F (36.6 °C)] 97.9 °F (36.6 °C)  Pulse:  [57-80] 71  Resp:  [17-18] 18  SpO2:  [93 %-100 %] 96 %  BP: ()/(55-81) 121/71     Weight: 81.2 kg (179 lb)  Body mass index is 28.89 kg/m².    Intake/Output Summary (Last 24 hours) at 12/18/2021 1356  Last data filed at 12/18/2021 0600  Gross per 24 hour   Intake 1052.12 ml   Output 725 ml   Net 327.12 ml      Physical Exam  Vitals reviewed.   Constitutional:       General: He is not in acute distress.     Appearance: He is well-developed.   HENT:      Head: Normocephalic and atraumatic.   Eyes:      Extraocular Movements: Extraocular movements intact.      Pupils: Pupils are equal, round, and reactive to light.   Cardiovascular:      Rate and Rhythm: Normal rate and regular rhythm.   Pulmonary:      Effort: Pulmonary effort is normal. No respiratory distress.      Breath sounds: Normal breath sounds.   Abdominal:      General: Bowel sounds are normal. There is no distension.      Palpations: Abdomen is soft.      Tenderness: There is no abdominal tenderness.   Musculoskeletal:         General: No  swelling. Normal range of motion.      Cervical back: Normal range of motion and neck supple.   Skin:     General: Skin is warm.      Findings: No rash.   Neurological:      General: No focal deficit present.      Mental Status: He is alert and oriented to person, place, and time.   Psychiatric:         Mood and Affect: Mood normal.         Behavior: Behavior normal.         Significant Labs: All pertinent labs within the past 24 hours have been reviewed.    Significant Imaging: I have reviewed all pertinent imaging results/findings within the past 24 hours.

## 2021-12-19 VITALS
HEIGHT: 66 IN | RESPIRATION RATE: 18 BRPM | BODY MASS INDEX: 28.77 KG/M2 | HEART RATE: 73 BPM | SYSTOLIC BLOOD PRESSURE: 130 MMHG | TEMPERATURE: 98 F | DIASTOLIC BLOOD PRESSURE: 80 MMHG | OXYGEN SATURATION: 96 % | WEIGHT: 179 LBS

## 2021-12-19 PROBLEM — I27.20 PULMONARY HYPERTENSION, UNSPECIFIED: Status: ACTIVE | Noted: 2021-12-19

## 2021-12-19 LAB
ALBUMIN SERPL BCP-MCNC: 3.6 G/DL (ref 3.5–5.2)
ALP SERPL-CCNC: 68 U/L (ref 55–135)
ALT SERPL W/O P-5'-P-CCNC: 136 U/L (ref 10–44)
ANION GAP SERPL CALC-SCNC: 10 MMOL/L (ref 8–16)
APTT BLDCRRT: 40.4 SEC (ref 21–32)
APTT BLDCRRT: 40.4 SEC (ref 21–32)
AST SERPL-CCNC: 81 U/L (ref 10–40)
BILIRUB SERPL-MCNC: 1.4 MG/DL (ref 0.1–1)
BUN SERPL-MCNC: 16 MG/DL (ref 6–20)
CALCIUM SERPL-MCNC: 9.2 MG/DL (ref 8.7–10.5)
CHLORIDE SERPL-SCNC: 107 MMOL/L (ref 95–110)
CO2 SERPL-SCNC: 23 MMOL/L (ref 23–29)
CREAT SERPL-MCNC: 1 MG/DL (ref 0.5–1.4)
EST. GFR  (AFRICAN AMERICAN): >60 ML/MIN/1.73 M^2
EST. GFR  (NON AFRICAN AMERICAN): >60 ML/MIN/1.73 M^2
GLUCOSE SERPL-MCNC: 107 MG/DL (ref 70–110)
POTASSIUM SERPL-SCNC: 4.4 MMOL/L (ref 3.5–5.1)
PROT SERPL-MCNC: 6.8 G/DL (ref 6–8.4)
SODIUM SERPL-SCNC: 140 MMOL/L (ref 136–145)

## 2021-12-19 PROCEDURE — 25000003 PHARM REV CODE 250: Performed by: INTERNAL MEDICINE

## 2021-12-19 PROCEDURE — 93005 ELECTROCARDIOGRAM TRACING: CPT

## 2021-12-19 PROCEDURE — 94761 N-INVAS EAR/PLS OXIMETRY MLT: CPT

## 2021-12-19 PROCEDURE — 99233 PR SUBSEQUENT HOSPITAL CARE,LEVL III: ICD-10-PCS | Mod: ,,, | Performed by: INTERNAL MEDICINE

## 2021-12-19 PROCEDURE — 36415 COLL VENOUS BLD VENIPUNCTURE: CPT | Performed by: INTERNAL MEDICINE

## 2021-12-19 PROCEDURE — 99223 1ST HOSP IP/OBS HIGH 75: CPT | Mod: ,,, | Performed by: INTERNAL MEDICINE

## 2021-12-19 PROCEDURE — 85730 THROMBOPLASTIN TIME PARTIAL: CPT | Performed by: INTERNAL MEDICINE

## 2021-12-19 PROCEDURE — 99233 SBSQ HOSP IP/OBS HIGH 50: CPT | Mod: ,,, | Performed by: INTERNAL MEDICINE

## 2021-12-19 PROCEDURE — 99239 PR HOSPITAL DISCHARGE DAY,>30 MIN: ICD-10-PCS | Mod: ,,, | Performed by: INTERNAL MEDICINE

## 2021-12-19 PROCEDURE — 27000221 HC OXYGEN, UP TO 24 HOURS

## 2021-12-19 PROCEDURE — 80053 COMPREHEN METABOLIC PANEL: CPT | Performed by: INTERNAL MEDICINE

## 2021-12-19 PROCEDURE — 99223 PR INITIAL HOSPITAL CARE,LEVL III: ICD-10-PCS | Mod: ,,, | Performed by: INTERNAL MEDICINE

## 2021-12-19 PROCEDURE — 99239 HOSP IP/OBS DSCHRG MGMT >30: CPT | Mod: ,,, | Performed by: INTERNAL MEDICINE

## 2021-12-19 RX ORDER — FUROSEMIDE 40 MG/1
40 TABLET ORAL DAILY
Qty: 30 TABLET | Refills: 11
Start: 2021-12-19 | End: 2023-08-28

## 2021-12-19 RX ORDER — CARVEDILOL 12.5 MG/1
12.5 TABLET ORAL 2 TIMES DAILY WITH MEALS
Qty: 60 TABLET | Refills: 11
Start: 2021-12-19 | End: 2023-04-06 | Stop reason: SDUPTHER

## 2021-12-19 RX ORDER — ATORVASTATIN CALCIUM 40 MG/1
40 TABLET, FILM COATED ORAL DAILY
Qty: 90 TABLET | Refills: 3
Start: 2021-12-20 | End: 2022-12-20

## 2021-12-19 RX ORDER — SPIRONOLACTONE 25 MG/1
25 TABLET ORAL DAILY
Qty: 30 TABLET | Refills: 11
Start: 2021-12-19 | End: 2022-12-19

## 2021-12-19 RX ADMIN — ATORVASTATIN CALCIUM 40 MG: 20 TABLET, FILM COATED ORAL at 08:12

## 2021-12-19 RX ADMIN — APIXABAN 5 MG: 2.5 TABLET, FILM COATED ORAL at 10:12

## 2021-12-19 RX ADMIN — ALLOPURINOL 300 MG: 300 TABLET ORAL at 08:12

## 2021-12-19 NOTE — PLAN OF CARE
Patient is discharged home with self care. LMSW noted referral for outpatient pulmonary follow up. Patient lives in [place], [place].Local offices are closed for the weekend, LMSW unable to contact [facility] offices today for a medicaid referral. LMSW hard faxed referral to [facility]. AVS updated.     LMSW met with the patient at the bedside, provided a copy of the pulmonary referral and explained to patient [facility] clinic will reach out to him when they have a opening. Patient expressed understanding. CM needs addressed for discharge.     Family will transport patient home.      12/19/21 1502   Final Note   Assessment Type Final Discharge Note   Anticipated Discharge Disposition Home   What phone number can be called within the next 1-3 days to see how you are doing after discharge? [phone]

## 2021-12-19 NOTE — PROGRESS NOTES
[facility]  Cardiology  Progress Note    Patient Name: Claude Griffin  MRN: [id number]  Admission Date: 12/16/2021  Hospital Length of Stay: 3 days  Code Status: Full Code   Attending Physician: Salima Chu MD   Primary Care Physician: David Terrell MD  Expected Discharge Date:   Principal Problem:Chest pain    Subjective:     Brief HPI:    Claude Griffin is a 56 y.o.male with hypertension. He lives in [place], [place]. He had pulmonary emboli in early 2020. He was treated at [facility]. He has been anticoagulated with apixiban since. In about 2/2021 he was diagnosed with heart failure and he was admitted to [facility] for a few days. 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 He took it easy for a few days. On 12/16/2021 he was doing lighter clean up work when he began sweating, his chest began aching and he felt short of breath. He presented to [facility] in [place]. He was given NTG and the pain mostly subsided. It was arranged for him to be transferred to [facility] for his further care.        Hospital Course:     12/17/2021: Echo: Normal left ventricular size and systolic function. EF 60%. Moderate diastolic dysfunction. Severely enlarged right ventricle with severe systolic dysfunction. SPAP 66 mmHg.    12/17/2021: [facility]: Cath: LAD: Mid 30%. RHC: PA: 54/24. Mean 35.    12/18/2021: CTA: No acute PE.    Interval History:    Comfortable.    Denies chest pain or SOB.      Review of Systems   Constitutional: Negative for chills, fever and malaise/fatigue.   HENT: Negative for nosebleeds.    Eyes: Negative for vision loss in left eye and vision loss in right eye.   Cardiovascular: Negative for chest pain, leg swelling, orthopnea, palpitations and paroxysmal nocturnal dyspnea.   Respiratory: Negative for cough, hemoptysis, shortness of breath, sputum production and wheezing.     Hematologic/Lymphatic: Negative for bleeding problem. Does not bruise/bleed easily.   Skin: Negative for color change and rash.   Musculoskeletal: Negative for muscle weakness and myalgias.   Gastrointestinal: Negative for abdominal pain, heartburn, hematemesis, hematochezia, melena, nausea and vomiting.   Genitourinary: Negative for hematuria.   Neurological: Negative for dizziness, focal weakness, headaches, light-headedness, vertigo and weakness.   Psychiatric/Behavioral: Negative for altered mental status. The patient is not nervous/anxious.    Allergic/Immunologic: Negative for persistent infections.       Objective:     Vital Signs (Most Recent):  Temp: 96.8 °F (36 °C) (12/19/21 0714)  Pulse: 82 (12/19/21 1200)  Resp: 18 (12/19/21 0714)  BP: 135/82 (12/19/21 0714)  SpO2: 96 % (12/19/21 0714) Vital Signs (24h Range):  Temp:  [96.8 °F (36 °C)-98.3 °F (36.8 °C)] 96.8 °F (36 °C)  Pulse:  [64-84] 82  Resp:  [18] 18  SpO2:  [96 %-99 %] 96 %  BP: (111-143)/(71-84) 135/82     Weight: 81.2 kg (179 lb)  Body mass index is 28.89 kg/m².    SpO2: 96 %  O2 Device (Oxygen Therapy): nasal cannula      Intake/Output Summary (Last 24 hours) at 12/19/2021 1209  Last data filed at 12/19/2021 0749  Gross per 24 hour   Intake 240 ml   Output 1750 ml   Net -1510 ml       Lines/Drains/Airways     Peripheral Intravenous Line                 Peripheral IV - Single Lumen 12/17/21 1528 20 G Anterior;Left;Proximal Forearm 1 day         Peripheral IV - Single Lumen 12/18/21 0000 20 G Left;Anterior;Proximal Forearm 1 day                Physical Exam  Constitutional:       General: He is not in acute distress.     Appearance: Normal appearance. He is well-developed. He is not ill-appearing.   Eyes:      Conjunctiva/sclera:      Right eye: Right conjunctiva is not injected. No hemorrhage.     Left eye: Left conjunctiva is not injected. No hemorrhage.     Pupils:      Right eye: Pupil is round.      Left eye: Pupil is round.   Neck:       Vascular: No JVD.   Cardiovascular:      Rate and Rhythm: Normal rate and regular rhythm.      Heart sounds: S1 normal. No murmur heard.  Gallop present. S4 sounds present. No S3 sounds.       Comments: P2 pronounced.  Pulmonary:      Effort: Pulmonary effort is normal.      Breath sounds: Normal breath sounds.   Chest:      Chest wall: No swelling or tenderness.   Abdominal:      General: There is no distension.      Palpations: Abdomen is soft.      Tenderness: There is no abdominal tenderness.   Musculoskeletal:      Cervical back: Neck supple.      Right ankle: No swelling.      Left ankle: No swelling.   Skin:     General: Skin is warm and dry.      Findings: No rash.   Neurological:      General: No focal deficit present.      Mental Status: He is alert and oriented to person, place, and time. He is not disoriented.   Psychiatric:         Attention and Perception: Attention and perception normal.         Mood and Affect: Mood and affect normal.         Speech: Speech normal.         Behavior: Behavior normal.         Thought Content: Thought content normal.         Cognition and Memory: Cognition and memory normal.         Judgment: Judgment normal.         Current Medications:     allopurinoL  300 mg Oral Daily    apixaban  5 mg Oral BID    atorvastatin  40 mg Oral Daily     Current Laboratory Results:    Recent Results (from the past 24 hour(s))   HIV 1/2 Ag/Ab (4th Gen)    Collection Time: 12/18/21  5:45 PM   Result Value Ref Range    HIV 1/2 Ag/Ab Negative Negative   Comprehensive Metabolic Panel (CMP)    Collection Time: 12/19/21  4:41 AM   Result Value Ref Range    Sodium 140 136 - 145 mmol/L    Potassium 4.4 3.5 - 5.1 mmol/L    Chloride 107 95 - 110 mmol/L    CO2 23 23 - 29 mmol/L    Glucose 107 70 - 110 mg/dL    BUN 16 6 - 20 mg/dL    Creatinine 1.0 0.5 - 1.4 mg/dL    Calcium 9.2 8.7 - 10.5 mg/dL    Total Protein 6.8 6.0 - 8.4 g/dL    Albumin 3.6 3.5 - 5.2 g/dL    Total Bilirubin 1.4 (H) 0.1 - 1.0  mg/dL    Alkaline Phosphatase 68 55 - 135 U/L    AST 81 (H) 10 - 40 U/L     (H) 10 - 44 U/L    Anion Gap 10 8 - 16 mmol/L    eGFR if African American >60 >60 mL/min/1.73 m^2    eGFR if non African American >60 >60 mL/min/1.73 m^2   APTT    Collection Time: 12/19/21  4:41 AM   Result Value Ref Range    aPTT 40.4 (H) 21.0 - 32.0 sec   APTT    Collection Time: 12/19/21  4:41 AM   Result Value Ref Range    aPTT 40.4 (H) 21.0 - 32.0 sec     Current Imaging Results:    CTA Chest Non-Coronary(PE Studies)   Final Result      No evidence of pulmonary embolus.      Enlarged main pulmonary artery consistent with but not specific for pulmonary arterial hypertension.         Electronically signed by: Guilherme Akins MD   Date:    12/18/2021   Time:    14:53          12/17/2021: Echo:     The left ventricle is normal in size with normal systolic function.  The estimated ejection fraction is 60%.  Grade II left ventricular diastolic dysfunction.  There is abnormal septal wall motion. There is systolic flattening of the interventricular septum consistent with right ventricle pressure overload.  Moderate left atrial enlargement.  Severe right ventricular enlargement with severely reduced right ventricular systolic function.  Severe right atrial enlargement.  Mild mitral regurgitation.  Mild tricuspid regurgitation.  There is moderate pulmonary hypertension.  The estimated PA systolic pressure is 66 mmHg.  Intermediate central venous pressure (8 mmHg).      Assessment and Plan:     Problem List:    Active Diagnoses:    Diagnosis Date Noted POA    PRINCIPAL PROBLEM:  Chest pain [R07.9] 12/16/2021 Yes    Pulmonary hypertension, unspecified [I27.20] 12/19/2021 Yes    Coronary artery disease [I25.10] 12/18/2021 Unknown    Chronic anticoagulation [Z79.01] 12/18/2021 Not Applicable    Chronic pulmonary embolism [I27.82] 12/17/2021 Yes    CHF (congestive heart failure) [I50.9] 12/17/2021 Yes    Primary hypertension [I10]  12/16/2021 Yes      Problems Resolved During this Admission:       Assessment and Plan:    1. Coronary Artery Disease   12/17/2021: [facility]: Cath: LAD: Mid 30%. RHC: PA: 54/24. Mean 35.   12/17/2021: Chol 197. HDL 30. . .   On atorvastatin 40 mg Q24.   Medical therapy.   No aspirin as anticoagulated.    2. Chest Pain              12/16/2021: Chest pain for about 40 min that resolved after NTG sl.              ECG reveals T wave inversion V1-V6.          Possbly explained by pulmonary hypertension.     3. Pulmonary Hypertension   2020: Presented with pulmonary embolism   12/17/2021: Echo: Normal left ventricular size and systolic function. EF 60%. Moderate diastolic dysfunction. Severely enlarged right ventricle with severe systolic dysfunction. SPAP 66 mmHg.   12/17/2021: [facility]: Cath: RHC: PA: 54/24. Mean 35. PCWP was not done as felt CTA should be completed.   12/18/2021: CTA: No acute PE.   I would still have some concern about chronic thromboembolic disease.      4. Chronic Anticoagulation              2020: Presented with pulmonary embolism              On apixiban 5 mg Q12.              Says he has been 100% compliant since the PE.      4. History of Pulmonary Embolism              2020: Presented with pulmonary embolism.              Appears unprovoked.     5. Hypertension              2000: Diagnosed.              At home been on carvedilol 25 mg Q12, amlodipine 2.5 mg Q24, valsartan 320 mg Q24, spironolactone 25 mg Q24 and furosemide 40 mg Q24.    Would resume carvedilol at a lower dose, spironolactone 25 mg Q24 and furosemide 40 mg Q24.    6. Overweight   12/18/2021: Weight 81 kg. BMI 29.   Encouraged to lose weight.         VTE Risk Mitigation (From admission, onward)         Ordered     apixaban tablet 5 mg  2 times daily         12/19/21 0857     IP VTE LOW RISK PATIENT  Once         12/16/21 1741     Place sequential compression device  Until discontinued         12/16/21 1741                 Anjelica Botello MD  Cardiology  [facility])

## 2021-12-19 NOTE — ASSESSMENT & PLAN NOTE
Patient with symptoms of pulmonary hypertension with RHF over past 1-1.5 year since his PE diagnosis. His etiology of PH is unclear, many things on differential (left sided disease, 2/2 amphetamine use, CTEPH, underling rheumatologic disorder, etc...) Had LHC on 12/17 that showed nonobstructive coronary disease and RHC showing mPAP 35, no left sided pressures/PAOP/CO measured so unable to measure PVR which would help with etiology. CT PE performed showing no acute PE and normal pulmonary parenchyma, does have enlarged pulmonary trunk consistent with PH. Doesn't have any obvious evidence of CTEPH but best way to determine is by performing VQ scan + RHC. No evidence of diffuse parenchymal lung disease.  - BNP wnl. HIV negative. KISHA and RF pending.   - does have evidence of RHF and functional class 2 symptoms for PH  - patient reports has cardiologist but would recommend patient follow up with pulmonary hypertension specialist; at which time will likely need repeat RHC and VQ scan performed, this can all be done as outpatient and it is in this setting they can discuss further treatment options if warranted.   - Patient lives 2 hours away but states would consider coming to [place] for specialist; would consider referral to Dr. Mando Camejo or Dr. Jesse Broderick at [facility] or [facility] main [facility] for pulmonary hypertension

## 2021-12-19 NOTE — NURSING
Patient has remained therapeutic on Heparin nomogram.  IVF infusing at 12.7u/hr. Denies any CP or SOB.  NAD noted.  Will continue monitoring.

## 2021-12-19 NOTE — SUBJECTIVE & OBJECTIVE
Past Medical History:   Diagnosis Date    Coronary artery disease 12/18/2021    Gout age 40    Hypertension        Past Surgical History:   Procedure Laterality Date    NECK SURGERY  2009    Neck fusion       Review of patient's allergies indicates:   Allergen Reactions    Penicillins Other (See Comments)     Reaction unknown- childhood allergy       Family History     Problem Relation (Age of Onset)    Diabetes Mother    Hypertension Father        Tobacco Use    Smoking status: Never Smoker    Smokeless tobacco: Never Used   Substance and Sexual Activity    Alcohol use: Yes     Comment: only socially- very rare    Drug use: No    Sexual activity: Not on file         Review of Systems   Constitutional: Positive for activity change. Negative for appetite change, chills, fatigue and fever.   HENT: Negative for congestion and rhinorrhea.    Eyes: Negative for pain and itching.   Respiratory: Positive for shortness of breath. Negative for apnea, cough, chest tightness and wheezing.    Cardiovascular: Positive for chest pain and leg swelling. Negative for palpitations.   Gastrointestinal: Negative for abdominal pain, constipation, diarrhea and nausea.   Genitourinary: Negative for difficulty urinating, dysuria and frequency.   Musculoskeletal: Positive for arthralgias. Negative for joint swelling, myalgias and neck pain.   Skin: Negative for color change, pallor and rash.   Neurological: Positive for dizziness and light-headedness. Negative for syncope.   Psychiatric/Behavioral: Negative for confusion. The patient is not nervous/anxious.      Objective:     Vital Signs (Most Recent):  Temp: 96.8 °F (36 °C) (12/19/21 0714)  Pulse: 84 (12/19/21 0800)  Resp: 18 (12/19/21 0714)  BP: 135/82 (12/19/21 0714)  SpO2: 96 % (12/19/21 0714) Vital Signs (24h Range):  Temp:  [96.8 °F (36 °C)-98.3 °F (36.8 °C)] 96.8 °F (36 °C)  Pulse:  [64-84] 84  Resp:  [18] 18  SpO2:  [96 %-99 %] 96 %  BP: (111-143)/(71-84) 135/82      Weight: 81.2 kg (179 lb)  Body mass index is 28.89 kg/m².      Intake/Output Summary (Last 24 hours) at 12/19/2021 0912  Last data filed at 12/19/2021 0749  Gross per 24 hour   Intake 240 ml   Output 1750 ml   Net -1510 ml       Physical Exam  Vitals and nursing note reviewed.   Constitutional:       General: He is not in acute distress.     Appearance: Normal appearance. He is not ill-appearing or toxic-appearing.   HENT:      Head: Normocephalic and atraumatic.      Nose: Nose normal. No congestion.      Mouth/Throat:      Mouth: Mucous membranes are moist.      Pharynx: Oropharynx is clear.   Eyes:      Extraocular Movements: Extraocular movements intact.      Conjunctiva/sclera: Conjunctivae normal.      Pupils: Pupils are equal, round, and reactive to light.   Cardiovascular:      Rate and Rhythm: Normal rate and regular rhythm.      Pulses: Normal pulses.      Heart sounds: No murmur heard.      Abdominal:      General: Bowel sounds are normal. There is no distension.      Palpations: Abdomen is soft.   Musculoskeletal:         General: No swelling or deformity. Normal range of motion.      Right lower leg: No edema.      Left lower leg: No edema.   Skin:     General: Skin is warm and dry.      Capillary Refill: Capillary refill takes less than 2 seconds.      Findings: No rash.   Neurological:      General: No focal deficit present.      Mental Status: He is alert and oriented to person, place, and time.      Motor: No weakness.   Psychiatric:         Mood and Affect: Mood normal.         Behavior: Behavior normal.         Thought Content: Thought content normal.         Vents:  Oxygen Concentration (%): 28 (12/16/21 1818)    Lines/Drains/Airways     Peripheral Intravenous Line                 Peripheral IV - Single Lumen 12/17/21 1528 20 G Anterior;Left;Proximal Forearm 1 day         Peripheral IV - Single Lumen 12/18/21 0000 20 G Left;Anterior;Proximal Forearm 1 day                Significant  Labs:    CBC/Anemia Profile:  Recent Labs   Lab 12/17/21  2233 12/18/21  0517   WBC 7.44 5.83   HGB 17.5 14.9   HCT 52.7 45.5    190   MCV 93 93   RDW 13.5 13.4        Chemistries:  Recent Labs   Lab 12/18/21  0517 12/19/21  0441     139 140   K 4.5  4.5 4.4     104 107   CO2 23  23 23   BUN 21*  21* 16   CREATININE 1.1  1.1 1.0   CALCIUM 9.0  9.0 9.2   ALBUMIN 3.5 3.6   PROT 6.6 6.8   BILITOT 1.4* 1.4*   ALKPHOS 69 68   * 136*   AST 83* 81*   MG 2.1  --    PHOS 4.1  --        All pertinent labs within the past 24 hours have been reviewed.    Significant Imaging:   I have reviewed and interpreted all pertinent imaging results/findings within the past 24 hours.

## 2021-12-19 NOTE — CONSULTS
[facility] ([facility])  Pulmonology  Consult Note    Patient Name: Claude Griffin  MRN: [id number]  Admission Date: 12/16/2021  Hospital Length of Stay: 3 days  Code Status: Full Code  Attending Physician: Salima Chu MD  Primary Care Provider: David Terrell MD   Principal Problem: Chest pain    Inpatient consult to Pulmonary Critical Care  Consult performed by: Natalia Chand MD  Consult ordered by: Salima Chu MD        Subjective:     HPI:  Mr. 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 Transferred to [facility] for cardiology/ACS rule out.     Troponin negative. BNP wnl. Underwent LHC/RHC on 12/17 that showed nonobstructive coronary disease. RHC showing mPAP 35. No left sided pressures or CO/PVR measured. CT PE performed showing no acute PE and completely normal pulmonary parenchyma.    States was diagnosed with PE in 2020. He reports no provoking factors (no surgery/travel/malignancy/injury/etc). Did have foot surgery but reports that occurred in 2019. Has been on eliquis since.     States over past 1.5 years (Since around his PE diagnosis) has noticed he gets SOB, dizziness, and sharp chest pains when exerting himself (such as when exerting himself at work, lifting objects > 20lbs). States he feels like is going to pass out so he has to stop and rest. Does not get these symptoms when walking on flat ground or performing ADLs. States these symptoms have been relatively stable over the past few months. Reports diffuse joint pains/aches/occasional swelling of said joints in his hands, knees, ankles, and toes. Also some occasional numbness in hands. Denies fever, chills, weight loss, rashes, prior lung disease, h/o rheumatologic disorders.     Patient reports never smoker. Worked in  shipyard welding/working in tanks x 31 years, also does carpentry on the side.     Prior intranasal amphetamine use--unclear how much/often;  used in his teens then some during adulthood when his wife passed away-- has been sober for the past 3-4 years.     At home for his heart failure he is receiving coreg, valsartan, aldactone, and 40 mg lasix daily.  is receiving this from his cardiologist after being diagnosed with heart failure approx 6 months after his PE. Again asked if he has even been told he has left sided heart failure and he reports no he has been told is right-sided heart failure. Has never had outpatient RHC or VQ scan.     Past Medical History:   Diagnosis Date    Coronary artery disease 12/18/2021    Gout age 40    Hypertension        Past Surgical History:   Procedure Laterality Date    NECK SURGERY  2009    Neck fusion       Review of patient's allergies indicates:   Allergen Reactions    Penicillins Other (See Comments)     Reaction unknown- childhood allergy       Family History     Problem Relation (Age of Onset)    Diabetes Mother    Hypertension Father        Tobacco Use    Smoking status: Never Smoker    Smokeless tobacco: Never Used   Substance and Sexual Activity    Alcohol use: Yes     Comment: only socially- very rare    Drug use: No    Sexual activity: Not on file         Review of Systems   Constitutional: Positive for activity change. Negative for appetite change, chills, fatigue and fever.   HENT: Negative for congestion and rhinorrhea.    Eyes: Negative for pain and itching.   Respiratory: Positive for shortness of breath. Negative for apnea, cough, chest tightness and wheezing.    Cardiovascular: Positive for chest pain and leg swelling. Negative for palpitations.   Gastrointestinal: Negative for abdominal pain, constipation, diarrhea and nausea.   Genitourinary: Negative for difficulty urinating, dysuria and frequency.   Musculoskeletal: Positive for  arthralgias. Negative for joint swelling, myalgias and neck pain.   Skin: Negative for color change, pallor and rash.   Neurological: Positive for dizziness and light-headedness. Negative for syncope.   Psychiatric/Behavioral: Negative for confusion. The patient is not nervous/anxious.      Objective:     Vital Signs (Most Recent):  Temp: 96.8 °F (36 °C) (12/19/21 0714)  Pulse: 84 (12/19/21 0800)  Resp: 18 (12/19/21 0714)  BP: 135/82 (12/19/21 0714)  SpO2: 96 % (12/19/21 0714) Vital Signs (24h Range):  Temp:  [96.8 °F (36 °C)-98.3 °F (36.8 °C)] 96.8 °F (36 °C)  Pulse:  [64-84] 84  Resp:  [18] 18  SpO2:  [96 %-99 %] 96 %  BP: (111-143)/(71-84) 135/82     Weight: 81.2 kg (179 lb)  Body mass index is 28.89 kg/m².      Intake/Output Summary (Last 24 hours) at 12/19/2021 0912  Last data filed at 12/19/2021 0749  Gross per 24 hour   Intake 240 ml   Output 1750 ml   Net -1510 ml       Physical Exam  Vitals and nursing note reviewed.   Constitutional:       General: He is not in acute distress.     Appearance: Normal appearance. He is not ill-appearing or toxic-appearing.   HENT:      Head: Normocephalic and atraumatic.      Nose: Nose normal. No congestion.      Mouth/Throat:      Mouth: Mucous membranes are moist.      Pharynx: Oropharynx is clear.   Eyes:      Extraocular Movements: Extraocular movements intact.      Conjunctiva/sclera: Conjunctivae normal.      Pupils: Pupils are equal, round, and reactive to light.   Cardiovascular:      Rate and Rhythm: Normal rate and regular rhythm.      Pulses: Normal pulses.      Heart sounds: No murmur heard.      Abdominal:      General: Bowel sounds are normal. There is no distension.      Palpations: Abdomen is soft.   Musculoskeletal:         General: No swelling or deformity. Normal range of motion.      Right lower leg: No edema.      Left lower leg: No edema.   Skin:     General: Skin is warm and dry.      Capillary Refill: Capillary refill takes less than 2 seconds.       Findings: No rash.   Neurological:      General: No focal deficit present.      Mental Status: He is alert and oriented to person, place, and time.      Motor: No weakness.   Psychiatric:         Mood and Affect: Mood normal.         Behavior: Behavior normal.         Thought Content: Thought content normal.         Vents:  Oxygen Concentration (%): 28 (12/16/21 1818)    Lines/Drains/Airways     Peripheral Intravenous Line                 Peripheral IV - Single Lumen 12/17/21 1528 20 G Anterior;Left;Proximal Forearm 1 day         Peripheral IV - Single Lumen 12/18/21 0000 20 G Left;Anterior;Proximal Forearm 1 day                Significant Labs:    CBC/Anemia Profile:  Recent Labs   Lab 12/17/21  2233 12/18/21  0517   WBC 7.44 5.83   HGB 17.5 14.9   HCT 52.7 45.5    190   MCV 93 93   RDW 13.5 13.4        Chemistries:  Recent Labs   Lab 12/18/21  0517 12/19/21  0441     139 140   K 4.5  4.5 4.4     104 107   CO2 23  23 23   BUN 21*  21* 16   CREATININE 1.1  1.1 1.0   CALCIUM 9.0  9.0 9.2   ALBUMIN 3.5 3.6   PROT 6.6 6.8   BILITOT 1.4* 1.4*   ALKPHOS 69 68   * 136*   AST 83* 81*   MG 2.1  --    PHOS 4.1  --        All pertinent labs within the past 24 hours have been reviewed.    Significant Imaging:   I have reviewed and interpreted all pertinent imaging results/findings within the past 24 hours.    Assessment/Plan:     Pulmonary hypertension, unspecified  Patient with symptoms of pulmonary hypertension with RHF over past 1-1.5 year since his PE diagnosis. His etiology of PH is unclear, many things on differential (left sided disease, 2/2 amphetamine use, CTEPH, underling rheumatologic disorder, etc...) Had LHC on 12/17 that showed nonobstructive coronary disease and RHC showing mPAP 35, no left sided pressures/PAOP/CO measured so unable to measure PVR which would help with etiology. CT PE performed showing no acute PE and normal pulmonary parenchyma, does have enlarged pulmonary  trunk consistent with PH. Doesn't have any obvious evidence of CTEPH but best way to determine is by performing VQ scan + RHC. No evidence of diffuse parenchymal lung disease.  - BNP wnl. HIV negative. KISHA and RF pending.   - TTE 12/17 with abnormal septal wall motion. There is systolic flattening of the interventricular septum consistent with right ventricle pressure overload. Moderate left atrial enlargement. severe right ventricular enlargement with severely reduced right ventricular systolic function and severe right atrial enlargement.  - does have evidence of RHF and functional class 2 symptoms for PH  - patient reports has cardiologist but would recommend patient follow up with pulmonary hypertension specialist; at which time will likely need repeat RHC and VQ scan performed, this can all be done as outpatient and it is in this setting they can discuss further treatment options if warranted.   - Patient lives 2 hours away but states would consider coming to [place] for specialist; would consider referral to Dr. Mando Camejo or Dr. Jesse Broderick at [facility] or [facility] for pulmonary hypertension     Chronic pulmonary embolism  - appears to be unprovoked from history, diagnosed in 2020. CT PE 12/18 without evidence of acute PE.   - continue eliquis         Thank you for your consult. I will sign off. Please contact us if you have any additional questions.     Natalia Chand MD  Pulmonology  [facility] ([facility])

## 2021-12-19 NOTE — NURSING
Patient given discharge instructions.  All questions answered.  Awaiting transporty for discharge.

## 2021-12-19 NOTE — ASSESSMENT & PLAN NOTE
- appears to be unprovoked from history, diagnosed in 2020. CT PE 12/18 without evidence of acute PE.   - continue eliquis

## 2021-12-19 NOTE — HPI
Mr. 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 Transferred to [facility] for cardiology/ACS rule out.     Troponin negative. BNP wnl. Underwent LHC/RHC on 12/17 that showed nonobstructive coronary disease. RHC showing mPAP 35. No left sided pressures or CO/PVR measured. CT PE performed showing no acute PE and completely normal pulmonary parenchyma.     was diagnosed with PE in 2020. He reports no provoking factors (no surgery/travel/malignancy/injury/etc). Did have foot surgery but reports that occurred in 2019. Has been on eliquis since.      over past 1.5 years (Since around his PE diagnosis) has noticed he gets SOB, dizziness, and sharp chest pains when exerting himself (such as when exerting himself at work, lifting objects > 20lbs). States he feels like is going to pass out so he has to stop and rest. Does not get these symptoms when walking on flat ground or performing ADLs. States these symptoms have been relatively stable over the past few months. Reports diffuse joint pains/aches/occasional swelling of said joints in his hands, knees, ankles, and toes. Also some occasional numbness in hands. Denies fever, chills, weight loss, rashes, prior lung disease, h/o rheumatologic disorders.     Patient reports never smoker. 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     Prior intranasal amphetamine use--unclear how much/often; states used in his teens then some during adulthood when his wife passed away-- has been sober for the past 3-4 years.     At home for his heart failure he is receiving coreg, valsartan, aldactone, and 40 mg lasix daily.  is receiving this from his cardiologist after being diagnosed with heart failure approx  6 months after his PE. Again asked if he has even been told he has left sided heart failure and he reports no he has been told is right-sided heart failure. Has never had outpatient RHC or VQ scan.

## 2021-12-20 ENCOUNTER — TELEPHONE (OUTPATIENT)
Dept: CARDIOLOGY | Facility: CLINIC | Age: 56
End: 2021-12-20
Payer: MEDICAID

## 2021-12-20 LAB
ANA SER QL IF: NORMAL
RHEUMATOID FACT SERPL-ACNC: 24 IU/ML (ref 0–15)

## 2021-12-22 ENCOUNTER — TELEPHONE (OUTPATIENT)
Dept: CARDIOLOGY | Facility: CLINIC | Age: 56
End: 2021-12-22
Payer: MEDICAID

## 2021-12-28 NOTE — PHYSICIAN QUERY
PT Name: Claude Griffin  MR #: [id number]     DOCUMENTATION CLARIFICATION     CDS/: Verito Negron RN CDIS              Contact information: [email or web address]    This form is a permanent document in the medical record.     Query Date: December 27, 2021    By submitting this query, we are merely seeking further clarification of documentation.  Please utilize your independent clinical judgment when addressing the question(s) below.    The Medical Record contains the following   Indicators Supporting Clinical Findings Location in Medical Record   x Heart Failure documented CHF (congestive heart failure)    CHF (congestive heart failure)  Diagnosed a year ago.  Right heart failure  Fluid status looks ok  Holding lasix, aldactone with low bp    In about 2/2021 he was diagnosed with heart failure  Cards  Note 12/19    Hm note 12/18            Cards note 12/18     x BNP 12 Labs    x EF/Echo The left ventricle is normal in size with normal systolic function.  The estimated ejection fraction is 60%.  Grade II left ventricular diastolic dysfunction.  There is abnormal septal wall motion. There is systolic flattening of the interventricular septum consistent with right ventricle pressure overload.  Moderate left atrial enlargement.  Severe right ventricular enlargement with severely reduced right ventricular systolic function.  Severe right atrial enlargement.  Mild mitral regurgitation.  Mild tricuspid regurgitation.  There is moderate pulmonary hypertension.  The estimated PA systolic pressure is 66 mmHg.  Intermediate central venous pressure (8 mmHg).     Echo 12/17     Radiology findings      Subjective/Objective Respiratory Conditions      Recent/Current MI      Heart Transplant, LVAD      Edema, JVD      Ascites     x Diuretics/Meds furosemide tablet 40 mg  Dose: 40 mg  Freq: Daily Route: Oral  Start: 12/17/21 0900 End: 12/17/21 0847 MAR     Other Treatment      Other       Heart failure is a clinical diagnosis  which includes symptomatic fluid retention, elevated intracardiac pressures, and/or the inability of the heart to deliver adequate blood flow.    Heart Failure with reduced Ejection Fraction (HFrEF) or Systolic Heart Failure (loses ability to contract normally, EF is <40%)    Heart Failure with preserved Ejection Fraction (HFpEF) or Diastolic Heart Failure (stiff ventricles, does not relax properly, EF is >50%)     Heart Failure with Combined Systolic and Diastolic Failure (stiff ventricles, does not relax properly and EF is <50%)    Mid-range or mildly reduced ejection fraction (HFmrEF) is classified as systolic heart failure.   Common clues to acute exacerbation:  Rapidly progressive symptoms (w/in 2 weeks of presentation), using IV diuretics, using supplemental O2, pulmonary edema on Xray, new or worsening pleural effusion, +JVD or other signs of volume overload, MI w/in 4 weeks, and/or BNP >500  The clinical guidelines noted are only system guidelines, and do not replace the providers clinical judgment.    Provider, please specify the diagnosis associated with the above clinical findings.    [   ]  Chronic Diastolic Heart Failure (HFpEF) - preexisting and stable   [ x  ]  Chronic Right heart failure   [   ]  Other (please specify): ___________________________________   [  ]  Clinically Undetermined         Please document in your progress notes daily for the duration of treatment until resolved and include in your discharge summary.    References:  American Heart Association editorial staff. (2017, May). Ejection Fraction Heart Failure Measurement. American Heart Association. https://www.heart.org/en/health-topics/heart-failure/diagnosing-heart-failure/ejection-fraction-heart-failure-measurement#:~:text=Ejection%20fraction%20(EF)%20is%20a,pushed%20out%20with%20each%20heartbeat  ALE Iqbal (2020, December 15). Heart failure with preserved ejection fraction: Clinical manifestations and diagnosis. UpToDate.  https://www.[employer]/contents/heart-failure-with-preserved-ejection-fraction-clinical-manifestations-and-diagnosis.  ICD-10-CM/PCS Coding Clinic Third Quarter ICD-10, Effective with discharges: September 8, 2020 [facility] [employer] § Heart failure with mid-range or mildly reduced ejection fraction (2020).  Form No. 29059

## 2021-12-28 NOTE — PHYSICIAN QUERY
PT Name: Claude Griffin  MR #: [id number]     Documentation Clarification      CDS/: Verito Negron RN CDIS           Contact information: [email or web address]      This form is a permanent document in the medical record.     Query Date: December 27, 2021    By submitting this query, we are merely seeking further clarification of documentation. Please utilize your independent clinical judgment when addressing the question(s) below.    The Medical Record reflects the following:    Supporting Clinical Findings Location in Medical Record   2. Chest Pain              12/16/2021: Chest pain for about 40 min that resolved after NTG sl.              ECG reveals T wave inversion V1-V6.                     Possbly explained by pulmonary hypertension.     1. Coronary Artery Disease              12/17/2021: [facility]: Cath: LAD: Mid 30%. RHC: PA: 54/24. Mean 35.              12/17/2021: Chol 197. HDL 30. . .              On atorvastatin 40 mg Q24.              Medical therapy.              No aspirin as anticoagulated.    3. Pulmonary Hypertension              2020: Presented with pulmonary embolism              12/17/2021: Echo: Normal left ventricular size and systolic function. EF 60%. Moderate diastolic dysfunction. Severely enlarged right ventricle with severe systolic dysfunction. SPAP 66 mmHg.              12/17/2021: [facility]: Cath: RHC: PA: 54/24. Mean 35. PCWP was not done as felt CTA should be completed.              12/18/2021: CTA: No acute PE.              I would still have some concern about chronic thromboembolic disease.    Myocardial injury  Patient reports chest pain, troponin <.005, ST depression in V3,V4     Consult Cardiology  Cardiac monitoring  Trend Troponin  EKG Cards note 12/19          Cards note 12/19              Cards note 12/19                    H&P                                                                             Please clarify the etiology of the Chest pain:     [ x  ]  Chest pain due to Pulmonary hypertension    [   ] Chest pain due to NSTEMI Type 2 due to Pulmonary hypertension    [   ] Chest pain due to Coronary Artery disease    [   ] Non-ischemic myocardial injury (non-traumatic)   [   ] Other (please specify): ____________   [  ] Clinically undetermined

## 2022-01-02 NOTE — DISCHARGE SUMMARY
[facility] Medicine  Discharge Summary      Patient Name: Claude Griffin  MRN: [id number]  Patient Class: IP- Inpatient  Admission Date: 12/16/2021  Hospital Length of Stay: 3 days  Discharge Date and Time: 12/19/2021  5:00 PM  Attending Physician: No att. 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  Patient had a milder episode earlier this week. Labs WBC 5.8, Hb 16.7, Plts 235, Na 142, K 4.3, CO2 28, Cr 1.5, BNP 23, troponin < 0.05, repeat troponin pending.  COVID neg.  CXR pos for mild PVC.  EKG pos for ST depression in V3-V4 which was new when compared to prior.  Patient given Lasix, sublingual NG and aspirin.  CP has resolved.  The patient was transferred to [facility] for Cardiology consult.      Procedure(s) (LRB):  CATHETERIZATION, HEART, BOTH LEFT AND RIGHT (N/A)      Hospital Course:   Had lhc on 12/17/21 which showed LAD: Mid 30%, RHC: PA: 54/24. Mean 35.Patients bp dropped and his bp meds were held.Cta chest did not show pulmonary embolism, showed enlarged main pulmonary artery consistent with but not specific for pulmonary arterial hypertension. He will need to follow up with pulm htn specialists at [place].       Goals of Care Treatment Preferences:  Code Status: Full Code      Consults:   Consults (From admission, onward)        Status Ordering Provider     Inpatient consult to Pulmonary Critical Care  Once        Provider:  Natalia Chand MD    Completed MYLA CHU     Inpatient consult to Cardiology  Once        Provider:  (Not yet assigned)    Completed YAYO SANDERS          * Chest pain  Patient reports chest pain, troponin <.005, ST  depression in V3,V4    Consulted Cardiology,[facility] showed non obs  cad  Cardiac monitoring  Trend Troponin, negative  On statin    Results for orders placed during the hospital encounter of 12/16/21    Echo Saline Bubble? No    Interpretation Summary  · The left ventricle is normal in size with normal systolic function.  · The estimated ejection fraction is 60%.  · Grade II left ventricular diastolic dysfunction.  · There is abnormal septal wall motion. There is systolic flattening of the interventricular septum consistent with right ventricle pressure overload.  · Moderate left atrial enlargement.  · Severe right ventricular enlargement with severely reduced right ventricular systolic function.  · Severe right atrial enlargement.  · Mild mitral regurgitation.  · Mild tricuspid regurgitation.  · There is moderate pulmonary hypertension.  · The estimated PA systolic pressure is 66 mmHg.  · Intermediate central venous pressure (8 mmHg).          CHF (congestive heart failure)  Diagnosed a year ago.  Right heart failure  Fluid status looks ok  Holding lasix, aldactone with low bp  Resumed on dc      Chronic pulmonary embolism  Diagnosed a year ago  On eliquis at home  Transitioned to heparin drip  Ddimer, bnp normal  cta chest today to follow on pulmonary embolism/lung parenchyma with pulmonary htn and reduced right ventricular function on echo showed no evidence of PE.  Restarted eliquis.      Primary hypertension  Normotensive currently    Continue valsartan, aldactone, coreg, amlodipine  Home meds show losartan and valsartan  Holding as bp dropped after procedure, currently normal  Resumed some meds on dc.        Final Active Diagnoses:    Diagnosis Date Noted POA    PRINCIPAL PROBLEM:  Chest pain [R07.9] 12/16/2021 Yes    Pulmonary hypertension, unspecified [I27.20] 12/19/2021 Yes    Coronary artery disease [I25.10] 12/18/2021 Unknown    Chronic anticoagulation [Z79.01] 12/18/2021 Not Applicable    Chronic  pulmonary embolism [I27.82] 12/17/2021 Yes    CHF (congestive heart failure) [I50.9] 12/17/2021 Yes    Primary hypertension [I10] 12/16/2021 Yes      Problems Resolved During this Admission:       Discharged Condition: stable    Disposition: Home or Self Care    Follow Up:   Follow-up Information     David Terrell MD In 1 week.    Specialty: Family Medicine  Contact information:  [address]  [facility] [place] [id number] [phone]             Carina Camejo MD.    Specialty: Pulmonary Disease  Contact information:  [address] [facility] [address] [phone]             [facility] - PULMONOLOGY.    Specialty: Pulmonary Disease  Contact information:  [address] [place] [address] [phone]                       Patient Instructions:      Ambulatory referral/consult to Pulmonary Hypertension   Standing Status: Future   Referral Priority: Routine Referral Type: Consultation   Referral Reason: Specialty Services Required   Referred to Provider: CARINA CAMEJO Requested Specialty: Pulmonary Disease   Number of Visits Requested: 1     Diet Cardiac     Diet Cardiac     Activity as tolerated       Significant Diagnostic Studies:   Lab Results   Component Value Date    WBC 5.83 12/18/2021    HGB 14.9 12/18/2021    HCT 45.5 12/18/2021    MCV 93 12/18/2021     12/18/2021       BMP  Lab Results   Component Value Date     12/19/2021    K 4.4 12/19/2021     12/19/2021    CO2 23 12/19/2021    BUN 16 12/19/2021    CREATININE 1.0 12/19/2021    CALCIUM 9.2 12/19/2021    ANIONGAP 10 12/19/2021    ESTGFRAFRICA >60 12/19/2021    EGFRNONAA >60 12/19/2021     Cardiac catheterization  · There was non-obstructive coronary artery disease..  · Pulmonary hypertension was moderate.     The procedure log was documented by No documenter listed and verified by   Anjelica Botello MD.    Date: 12/17/2021  Time: 5:04 PM      EXAMINATION:  CTA CHEST NON  CORONARY     CLINICAL HISTORY:  h/p pulmonary embolism, pulmonary htn, r/o chronic PE;     TECHNIQUE:  Low dose axial images, sagittal and coronal reformations were obtained from the thoracic inlet to the lung bases following the IV administration of 100 mL of Omnipaque 350.  Contrast timing was optimized to evaluate the pulmonary arteries.  MIP images were performed.     FINDINGS:  This is an adequate pulmonary embolus study which is negative for pulmonary embolus.  There is no thoracic aortic aneurysm.     The soft tissues and vascular structures at the base the neck are unremarkable.  The mediastinum including the heart and great vessels is significant for minimal aortic calcification.  The main pulmonary artery is enlarged measuring 3.7 cm in diameter consistent with but not specific for pulmonary arterial hypertension.  There is calcification of the left anterior descending coronary artery.  The visualized intra-abdominal content is unremarkable.  The osseous structures demonstrate degenerative change.  There is partial visualization of cervical fixation hardware.     The trachea is patent and free of any intraluminal filling defects.  The lungs are well expanded.  There are calcified granuloma consistent with prior granulomatous disease.  There is no pleural or pericardial fluid.     Impression:     No evidence of pulmonary embolus.     Enlarged main pulmonary artery consistent with but not specific for pulmonary arterial hypertension.        Electronically signed by: Guilherme Akins MD  Date:                                            12/18/2021  Time:                                           14:53  Results for orders placed during the hospital encounter of 12/16/21    Echo Saline Bubble? No    Interpretation Summary  · The left ventricle is normal in size with normal systolic function.  · The estimated ejection fraction is 60%.  · Grade II left ventricular diastolic dysfunction.  · There is abnormal septal wall  motion. There is systolic flattening of the interventricular septum consistent with right ventricle pressure overload.  · Moderate left atrial enlargement.  · Severe right ventricular enlargement with severely reduced right ventricular systolic function.  · Severe right atrial enlargement.  · Mild mitral regurgitation.  · Mild tricuspid regurgitation.  · There is moderate pulmonary hypertension.  · The estimated PA systolic pressure is 66 mmHg.  · Intermediate central venous pressure (8 mmHg).      Pending Diagnostic Studies:     None         Medications:  Reconciled Home Medications:      Medication List      START taking these medications    apixaban 5 mg Tab  Commonly known as: ELIQUIS  Take 1 tablet (5 mg total) by mouth 2 (two) times daily.     atorvastatin 40 MG tablet  Commonly known as: LIPITOR  Take 1 tablet (40 mg total) by mouth once daily.     carvediloL 12.5 MG tablet  Commonly known as: COREG  Take 1 tablet (12.5 mg total) by mouth 2 (two) times daily with meals.     furosemide 40 MG tablet  Commonly known as: LASIX  Take 1 tablet (40 mg total) by mouth once daily.     spironolactone 25 MG tablet  Commonly known as: ALDACTONE  Take 1 tablet (25 mg total) by mouth once daily.        STOP taking these medications    allopurinoL 300 MG tablet  Commonly known as: ZYLOPRIM     lisinopriL-hydrochlorothiazide 20-25 mg Tab  Commonly known as: PRINZIDE,ZESTORETIC            Indwelling Lines/Drains at time of discharge:   Lines/Drains/Airways     None                 Time spent on the discharge of patient: 35 minutes         Salima Chu MD  Department of Hospital Medicine  [facility]

## 2022-01-02 NOTE — ASSESSMENT & PLAN NOTE
Patient reports chest pain, troponin <.005, ST depression in V3,V4    Consulted Cardiology,[facility] showed non obs  cad  Cardiac monitoring  Trend Troponin, negative  On statin    Results for orders placed during the hospital encounter of 12/16/21    Echo Saline Bubble? No    Interpretation Summary  · The left ventricle is normal in size with normal systolic function.  · The estimated ejection fraction is 60%.  · Grade II left ventricular diastolic dysfunction.  · There is abnormal septal wall motion. There is systolic flattening of the interventricular septum consistent with right ventricle pressure overload.  · Moderate left atrial enlargement.  · Severe right ventricular enlargement with severely reduced right ventricular systolic function.  · Severe right atrial enlargement.  · Mild mitral regurgitation.  · Mild tricuspid regurgitation.  · There is moderate pulmonary hypertension.  · The estimated PA systolic pressure is 66 mmHg.  · Intermediate central venous pressure (8 mmHg).

## 2022-01-02 NOTE — ASSESSMENT & PLAN NOTE
Diagnosed a year ago.  Right heart failure  Fluid status looks ok  Holding lasix, aldactone with low bp  Resumed on dc

## 2022-01-02 NOTE — ASSESSMENT & PLAN NOTE
Normotensive currently    Continue valsartan, aldactone, coreg, amlodipine  Home meds show losartan and valsartan  Holding as bp dropped after procedure, currently normal  Resumed some meds on dc.

## 2022-02-25 ENCOUNTER — TELEPHONE (OUTPATIENT)
Dept: TRANSPLANT | Facility: CLINIC | Age: 57
End: 2022-02-25
Payer: MEDICAID

## 2022-02-25 DIAGNOSIS — R06.82 TACHYPNEA: ICD-10-CM

## 2022-02-25 DIAGNOSIS — I27.9 CHRONIC PULMONARY HEART DISEASE: ICD-10-CM

## 2022-02-25 DIAGNOSIS — Z79.899 POLYPHARMACY: Primary | ICD-10-CM

## 2023-04-06 PROBLEM — I51.7 RIGHT ATRIAL ENLARGEMENT: Status: ACTIVE | Noted: 2023-04-06

## 2023-04-06 PROBLEM — R07.9 CHEST PAIN: Status: RESOLVED | Noted: 2021-12-16 | Resolved: 2023-04-06

## 2023-04-06 PROBLEM — I25.10 NON-OCCLUSIVE CORONARY ARTERY DISEASE: Status: ACTIVE | Noted: 2023-04-06

## 2023-04-06 PROBLEM — I51.7 RIGHT VENTRICULAR ENLARGEMENT: Status: ACTIVE | Noted: 2023-04-06

## 2023-04-06 PROBLEM — I82.409 RECURRENT DEEP VEIN THROMBOSIS (DVT): Status: ACTIVE | Noted: 2023-04-06

## 2023-04-06 PROBLEM — I25.10 CORONARY ARTERY DISEASE: Status: RESOLVED | Noted: 2021-12-18 | Resolved: 2023-04-06

## 2023-04-06 PROBLEM — I34.0 MILD MITRAL REGURGITATION: Status: ACTIVE | Noted: 2023-04-06

## 2023-04-06 PROBLEM — I27.20 MODERATE PULMONARY HYPERTENSION: Status: ACTIVE | Noted: 2023-04-06

## 2023-04-06 PROBLEM — I27.82 CHRONIC PULMONARY EMBOLISM: Status: RESOLVED | Noted: 2021-12-17 | Resolved: 2023-04-06

## 2023-04-06 PROBLEM — I27.20 PULMONARY HYPERTENSION, UNSPECIFIED: Status: RESOLVED | Noted: 2021-12-19 | Resolved: 2023-04-06

## 2023-04-06 PROBLEM — F15.11 HISTORY OF METHAMPHETAMINE ABUSE: Status: ACTIVE | Noted: 2023-04-06

## 2023-04-06 PROBLEM — I51.7 LEFT ATRIAL ENLARGEMENT: Status: ACTIVE | Noted: 2023-04-06

## 2023-04-06 PROBLEM — I51.89 DIASTOLIC DYSFUNCTION: Status: ACTIVE | Noted: 2023-04-06

## 2023-04-06 PROBLEM — E78.5 HYPERLIPIDEMIA: Status: ACTIVE | Noted: 2023-04-06

## 2023-04-06 PROBLEM — Z86.711 HISTORY OF PULMONARY EMBOLISM: Status: ACTIVE | Noted: 2023-04-06

## 2023-04-06 PROBLEM — I07.1 MILD TRICUSPID REGURGITATION: Status: ACTIVE | Noted: 2023-04-06

## 2023-08-28 PROBLEM — I50.9 CHF (CONGESTIVE HEART FAILURE): Status: RESOLVED | Noted: 2021-12-17 | Resolved: 2023-08-28

## 2023-08-28 PROBLEM — I50.20 HEART FAILURE WITH REDUCED EJECTION FRACTION, NYHA CLASS II: Status: ACTIVE | Noted: 2023-08-28

## 2023-10-09 NOTE — ASSESSMENT & PLAN NOTE
Diagnosed a year ago  On eliquis at home  Transitioned to heparin drip  Ddimer, bnp normal  cta chest today to follow on pulmonary embolism/lung parenchyma with pulmonary htn and reduced right ventricular function on echo showed no evidence of PE.  Restarted eliquis.     Complex Repair Preamble Text (Leave Blank If You Do Not Want): Extensive wide undermining was performed.

## (undated) DEVICE — CATH SWAN GANZ STND 7FR

## (undated) DEVICE — CATH OPTITORQUE RADIAL 5FR

## (undated) DEVICE — GLIDESHEATH SLENDER SS 5FR10CM

## (undated) DEVICE — SEE MEDLINE ITEM 157148

## (undated) DEVICE — SYR CNTRL MALE LL 10ML

## (undated) DEVICE — GUIDEWIRE ANGIO 1.5MM .035X180

## (undated) DEVICE — DRESSING TRANS 4X4 TEGADERM

## (undated) DEVICE — GUIDEWIRE .021X260CM J-3MM TIP

## (undated) DEVICE — INTRODUCER CATH 7F 11CML

## (undated) DEVICE — BAG DRAINAGE W/SPIKE

## (undated) DEVICE — HEMOSTAT VASC BAND REG 24CM

## (undated) DEVICE — DRESSING TRANS 2X2 TEGADERM

## (undated) DEVICE — OMNIPAQUE 350MG 150ML VIAL

## (undated) DEVICE — GUIDEWIRE 145CM .035

## (undated) DEVICE — Device

## (undated) DEVICE — SET MICPUNC ACC STIFF CANNULA

## (undated) DEVICE — KIT PROBE COVER WITH GEL

## (undated) DEVICE — COVER SANP CLR 36X54

## (undated) DEVICE — MANIFOLD PERCEPTOR MP 3P RH ON

## (undated) DEVICE — SYR 10ML LIDOCAINE

## (undated) DEVICE — STOPCOCK 3 WAY MED PRESSURE

## (undated) DEVICE — BAG SNAP KOVER BAND 36 X 28 IN